# Patient Record
Sex: MALE | Race: WHITE | NOT HISPANIC OR LATINO | Employment: UNEMPLOYED | URBAN - METROPOLITAN AREA
[De-identification: names, ages, dates, MRNs, and addresses within clinical notes are randomized per-mention and may not be internally consistent; named-entity substitution may affect disease eponyms.]

---

## 2024-01-11 ENCOUNTER — OFFICE VISIT (OUTPATIENT)
Dept: PRIMARY CARE | Facility: CLINIC | Age: 59
End: 2024-01-11
Payer: MEDICAID

## 2024-01-11 VITALS
OXYGEN SATURATION: 97 % | BODY MASS INDEX: 24.92 KG/M2 | WEIGHT: 188 LBS | HEART RATE: 71 BPM | DIASTOLIC BLOOD PRESSURE: 110 MMHG | HEIGHT: 73 IN | SYSTOLIC BLOOD PRESSURE: 154 MMHG

## 2024-01-11 DIAGNOSIS — Z00.00 HEALTH CARE MAINTENANCE: Primary | ICD-10-CM

## 2024-01-11 PROCEDURE — 99396 PREV VISIT EST AGE 40-64: CPT | Performed by: INTERNAL MEDICINE

## 2024-01-11 PROCEDURE — 1036F TOBACCO NON-USER: CPT | Performed by: INTERNAL MEDICINE

## 2024-01-11 RX ORDER — OMEPRAZOLE 40 MG/1
40 CAPSULE, DELAYED RELEASE ORAL AS NEEDED
COMMUNITY
End: 2024-03-07 | Stop reason: ALTCHOICE

## 2024-01-11 RX ORDER — METOPROLOL SUCCINATE 25 MG/1
25 TABLET, EXTENDED RELEASE ORAL DAILY
COMMUNITY
Start: 2024-01-02

## 2024-01-11 RX ORDER — LANOLIN ALCOHOL/MO/W.PET/CERES
1 CREAM (GRAM) TOPICAL DAILY
COMMUNITY

## 2024-01-11 ASSESSMENT — PATIENT HEALTH QUESTIONNAIRE - PHQ9
SUM OF ALL RESPONSES TO PHQ9 QUESTIONS 1 AND 2: 0
1. LITTLE INTEREST OR PLEASURE IN DOING THINGS: NOT AT ALL
2. FEELING DOWN, DEPRESSED OR HOPELESS: NOT AT ALL

## 2024-01-11 NOTE — PROGRESS NOTES
Reason for Visit: Annual Physical Exam  Allergies and Medications  Lisinopril, Amlodipine, and Metronidazole  Current Outpatient Medications   Medication Instructions    cyanocobalamin (Vitamin B-12) 1,000 mcg tablet 1 tablet, oral, Daily    metoprolol succinate XL (TOPROL-XL) 25 mg, oral, Daily    omeprazole (PRILOSEC) 40 mg, oral, Daily     HPI:  58-year-old male patient presented to the office today for his annual examination.  Patient is known to have history of recurrent diverticulitis treated on multiple occasions with long courses of antibiotics.  Last time I saw the patient in my office he had GI referral and he was supposed to schedule colonoscopy but his insurance changed and he was not able to come back to [facility] now.  He has been closely followed by gastroenterologist in [facility] and he is scheduled to proceed with upper endoscopy and colonoscopy with them in the near future.  He had presented to the [facility] emergency room twice for evaluation of chest pain.  His cardiac evaluation was negative and it was determined that his chest pain is related to his acid reflux.  CT scan was completed and it did demonstrate the presence of esophagitis in the distal esophagus and thickening of the esophagus.  Also there was a questionable mass at the gastroesophageal junction.  He is scheduled to proceed with upper endoscopy and ultrasound for further evaluation of the mass and the lining of his esophagus.  He has been taking Prilosec not on a daily basis and not on a regular matter.  He does have symptoms of heartburn and acid reflux and occasional episodes of chest pain that are significantly concerning and bothersome for him but he does respond nicely to Pepcid and a GI cocktail when he receives it in the emergency room.  Otherwise patient is doing great he has no other complaints he denies any urinary symptoms.    ROS otherwise negative aside from what was mentioned above in  "HPI.    Vitals  BP (!) 154/110 (BP Location: Right arm, Patient Position: Sitting)   Pulse 71   Ht 1.842 m (6' 0.5\")   Wt 85.3 kg (188 lb)   SpO2 97%   BMI 25.15 kg/m²   Body mass index is 25.15 kg/m².  Physical Exam  Physical Exam  Constitutional:       Appearance: Normal appearance.   HENT:      Head: Normocephalic and atraumatic.   Eyes:      Extraocular Movements: Extraocular movements intact.      Pupils: Pupils are equal, round, and reactive to light.   Cardiovascular:      Rate and Rhythm: Normal rate and regular rhythm.      Pulses: Normal pulses.      Heart sounds: Normal heart sounds.   Pulmonary:      Effort: Pulmonary effort is normal.      Breath sounds: Normal breath sounds.   Neurological:      General: No focal deficit present.      Mental Status: He is alert. Mental status is at baseline.   Psychiatric:         Mood and Affect: Mood normal.         Thought Content: Thought content normal.         Judgment: Judgment normal.        Active Problem List    Comprehensive Medical/Surgical/Social/Family History  Past Medical History:   Diagnosis Date    Abdominal distension (gaseous) 07/13/2018    Abdominal bloating    Abdominal distension (gaseous) 07/13/2018    Abdominal bloating    Abdominal distension (gaseous) 07/18/2018    Abdominal bloating    Abdominal distension (gaseous) 07/18/2018    Abdominal bloating    Abnormal levels of other serum enzymes     Elevated liver enzymes    Allergic rhinitis, unspecified 05/29/2020    Allergic rhinitis with postnasal drip    COVID-19 07/20/2022    COVID-19    Decreased white blood cell count, unspecified 05/29/2020    Leukopenia    Demyelinating disease of central nervous system, unspecified (CMS/[facility]) 10/14/2019    Demyelinating disorder    Diarrhea, unspecified 07/13/2018    Acute diarrhea    Diarrhea, unspecified 07/13/2018    Acute diarrhea    Diarrhea, unspecified 07/18/2018    Acute diarrhea    Diarrhea, unspecified 07/18/2018    Acute diarrhea    " Disease of stomach and duodenum, unspecified 07/13/2018    Subepithelial gastric lesion    Disease of stomach and duodenum, unspecified 07/13/2018    Subepithelial gastric lesion    Disease of stomach and duodenum, unspecified 07/18/2018    Subepithelial gastric lesion    Disease of stomach and duodenum, unspecified 07/18/2018    Subepithelial gastric lesion    Disorder of the skin and subcutaneous tissue, unspecified 09/05/2019    Skin abnormality    Essential (primary) hypertension 07/20/2022    HTN (hypertension), benign    Helicobacter pylori (H. pylori) as the cause of diseases classified elsewhere 10/11/2019    Helicobacter pylori [H. pylori] as the cause of diseases classified elsewhere    Impacted cerumen, left ear 12/19/2019    Impacted cerumen of left ear    Incomplete defecation 09/21/2020    Incomplete defecation    Iron deficiency     Low iron    Left upper quadrant pain 09/21/2020    Left upper quadrant abdominal pain    Long term (current) use of non-steroidal anti-inflammatories (nsaid) 06/29/2021    NSAID long-term use    Malabsorption due to intolerance, not elsewhere classified 07/13/2018    PLE (protein-losing enteropathy)    Malabsorption due to intolerance, not elsewhere classified 07/13/2018    PLE (protein-losing enteropathy)    Malabsorption due to intolerance, not elsewhere classified 07/18/2018    PLE (protein-losing enteropathy)    Malabsorption due to intolerance, not elsewhere classified 07/18/2018    PLE (protein-losing enteropathy)    Myopia, bilateral 05/24/2021    Bilateral myopia    Nontoxic multinodular goiter 03/26/2021    Multinodular goiter    Other chest pain 10/02/2020    Chest pain, non-cardiac    Other constipation 07/13/2018    Chronic constipation    Other constipation 07/13/2018    Chronic constipation    Other constipation 07/18/2018    Chronic constipation    Other constipation 07/18/2018    Chronic constipation    Other diseases of stomach and duodenum 07/13/2018     Gastric nodule    Other diseases of stomach and duodenum 07/13/2018    Gastric nodule    Other diseases of stomach and duodenum 07/18/2018    Gastric nodule    Other diseases of stomach and duodenum 07/18/2018    Gastric nodule    Other forms of dyspnea 01/10/2021    Exertional dyspnea    Other microscopic hematuria 07/13/2018    Microscopic hematuria    Other microscopic hematuria 07/13/2018    Microscopic hematuria    Other microscopic hematuria 07/18/2018    Microscopic hematuria    Other microscopic hematuria 07/18/2018    Microscopic hematuria    Otitis media, unspecified, right ear 09/12/2019    Right otitis media    Paresthesia of skin     Prickling sensation    Personal history of other diseases of the circulatory system 05/17/2018    History of paroxysmal supraventricular tachycardia    Personal history of other diseases of the nervous system and sense organs     History of paralysis    Personal history of other diseases of the respiratory system     History of sinusitis    Personal history of other diseases of the respiratory system 10/22/2019    History of nasal obstruction    Personal history of other diseases of the respiratory system 04/08/2020    History of acute sinusitis    Personal history of other specified conditions     H/O shortness of breath    Personal history of other specified conditions 09/20/2019    History of dyspnea    Personal history of other specified conditions 05/29/2020    History of palpitations    Personal history of other specified conditions 10/02/2020    History of dizziness    Personal history of other specified conditions 10/02/2020    History of palpitations    Personal history of other venous thrombosis and embolism     H/O blood clots    Post-traumatic headache, unspecified, not intractable 09/09/2020    Post-concussion headache    Unspecified asthma, uncomplicated 09/21/2021    Asthma    Unspecified atherosclerosis of native arteries of extremities, other extremity  (CMS/[facility]) 2020    Radial artery occlusion, right    Unspecified blepharitis right upper eyelid 2021    Blepharitis of right upper eyelid    Upper abdominal pain, unspecified 10/10/2018    Upper abdominal pain of unknown etiology    Upper abdominal pain, unspecified 2018    Upper abdominal pain of unknown etiology    Upper abdominal pain, unspecified 2018    Upper abdominal pain of unknown etiology    Upper abdominal pain, unspecified 2018    Upper abdominal pain of unknown etiology    Upper abdominal pain, unspecified 2018    Upper abdominal pain of unknown etiology     Past Surgical History:   Procedure Laterality Date    ADENOIDECTOMY  2018    Adenoidectomy    COLONOSCOPY  2018    Colonoscopy    CT ANGIO NECK  2021    CT NECK ANGIO W AND WO IV CONTRAST 2021 [facility]    CT HEAD ANGIO W AND WO IV CONTRAST  2021    CT HEAD ANGIO W AND WO IV CONTRAST 2021 [facility]    ESOPHAGOGASTRODUODENOSCOPY  2018    Diagnostic Esophagogastroduodenoscopy    OTHER SURGICAL HISTORY  2018    Endoscopic Ultrasound Pancreatic    OTHER SURGICAL HISTORY  2018    Append Laparosc Addl ___ Mm Port Placed In R Lower Abdomen    OTHER SURGICAL HISTORY  2022    Ganglion cyst excision    OTHER SURGICAL HISTORY  2019    Appendectomy    OTHER SURGICAL HISTORY  10/14/2019    Liver biopsy     Social History     Social History Narrative    Not on file   Patient does not smoke does not drink alcohol he drinks 1 cup of coffee a day he works as a [employer] .  Single no children.  No family history on file.   Family history  His great grandmother and mother side has history of colon cancer and stomach cancer.  His dad  in an accident in .  He has 1 full brother who is older and in good health and one half brother.  Assessment and Plan:  Problem List Items Addressed This Visit    None  Visit Diagnoses       Health care  maintenance    -  Primary    Relevant Orders    CBC    Comprehensive Metabolic Panel    Lipid Panel    PSA, Total and Free    TSH with reflex to Free T4 if abnormal        58-year-old patient with the following issues.    1.  Acid reflux with evidence of thickened esophagus distally noted and occasional episodes of chest pain likely related to acid reflux, patient was encouraged to take his proton pump inhibitor on a daily basis 30 minutes before breakfast without any interruption.  And he is to proceed with his GI evaluation in [facility] coming up for the upper endoscopy as well as an ultrasound for further evaluation of the questionable mass noted at the gastroesophageal junction.  Patient stated understanding he also was encouraged to avoid the triggers and follow diet and lifestyle modification.    2.  Recurrent episodes of diverticulitis he has been doing well from that perspective colonoscopy is scheduled in the near future.    3.  Hypertension, he thinks his blood pressure today is elevated but at home it is not he is going to check his blood pressure at home and record his readings and come back with his blood pressure machine we will adjust his beta-blocker dose and increase it to 50 mg p.o. daily I was hesitant to do that today because he told me he occasionally feels some lightheadedness when he changes position.  I encouraged him to hydrate to keep his intravascular volume repleted.    4.  Health maintenance issues, lab work is requested prostate cancer screening will be completed vaccines are up-to-date.    Disposition I will see the patient back in the office in 1 year for repeat physical and sooner if needed.  We would definitely monitor his blood pressure closely.

## 2024-01-12 ENCOUNTER — LAB (OUTPATIENT)
Dept: LAB | Facility: LAB | Age: 59
End: 2024-01-12
Payer: MEDICAID

## 2024-01-12 DIAGNOSIS — Z00.00 HEALTH CARE MAINTENANCE: ICD-10-CM

## 2024-01-12 LAB
ALBUMIN SERPL BCP-MCNC: 4.5 G/DL (ref 3.4–5)
ALP SERPL-CCNC: 53 U/L (ref 33–120)
ALT SERPL W P-5'-P-CCNC: 15 U/L (ref 10–52)
ANION GAP SERPL CALC-SCNC: 12 MMOL/L (ref 10–20)
AST SERPL W P-5'-P-CCNC: 17 U/L (ref 9–39)
BILIRUB SERPL-MCNC: 0.8 MG/DL (ref 0–1.2)
BUN SERPL-MCNC: 15 MG/DL (ref 6–23)
CALCIUM SERPL-MCNC: 9.4 MG/DL (ref 8.6–10.3)
CHLORIDE SERPL-SCNC: 104 MMOL/L (ref 98–107)
CHOLEST SERPL-MCNC: 225 MG/DL (ref 0–199)
CHOLESTEROL/HDL RATIO: 4
CO2 SERPL-SCNC: 28 MMOL/L (ref 21–32)
CREAT SERPL-MCNC: 1 MG/DL (ref 0.5–1.3)
EGFRCR SERPLBLD CKD-EPI 2021: 87 ML/MIN/1.73M*2
ERYTHROCYTE [DISTWIDTH] IN BLOOD BY AUTOMATED COUNT: 12.2 % (ref 11.5–14.5)
GLUCOSE SERPL-MCNC: 86 MG/DL (ref 74–99)
HCT VFR BLD AUTO: 43.1 % (ref 41–52)
HDLC SERPL-MCNC: 56.9 MG/DL
HGB BLD-MCNC: 14.3 G/DL (ref 13.5–17.5)
LDLC SERPL CALC-MCNC: 151 MG/DL
MCH RBC QN AUTO: 30.4 PG (ref 26–34)
MCHC RBC AUTO-ENTMCNC: 33.2 G/DL (ref 32–36)
MCV RBC AUTO: 92 FL (ref 80–100)
NON HDL CHOLESTEROL: 168 MG/DL (ref 0–149)
NRBC BLD-RTO: 0 /100 WBCS (ref 0–0)
PLATELET # BLD AUTO: 177 X10*3/UL (ref 150–450)
POTASSIUM SERPL-SCNC: 4.1 MMOL/L (ref 3.5–5.3)
PROT SERPL-MCNC: 6.7 G/DL (ref 6.4–8.2)
RBC # BLD AUTO: 4.71 X10*6/UL (ref 4.5–5.9)
SODIUM SERPL-SCNC: 140 MMOL/L (ref 136–145)
TRIGL SERPL-MCNC: 84 MG/DL (ref 0–149)
TSH SERPL-ACNC: 1.88 MIU/L (ref 0.44–3.98)
VLDL: 17 MG/DL (ref 0–40)
WBC # BLD AUTO: 4 X10*3/UL (ref 4.4–11.3)

## 2024-01-12 PROCEDURE — 80053 COMPREHEN METABOLIC PANEL: CPT

## 2024-01-12 PROCEDURE — 84153 ASSAY OF PSA TOTAL: CPT

## 2024-01-12 PROCEDURE — 80061 LIPID PANEL: CPT

## 2024-01-12 PROCEDURE — 84154 ASSAY OF PSA FREE: CPT

## 2024-01-12 PROCEDURE — 36415 COLL VENOUS BLD VENIPUNCTURE: CPT

## 2024-01-12 PROCEDURE — 85027 COMPLETE CBC AUTOMATED: CPT

## 2024-01-12 PROCEDURE — 84443 ASSAY THYROID STIM HORMONE: CPT

## 2024-01-15 LAB
PSA FREE MFR SERPL: 50 %
PSA FREE SERPL-MCNC: 0.3 NG/ML
PSA SERPL IA-MCNC: 0.6 NG/ML (ref 0–4)

## 2024-01-18 DIAGNOSIS — R07.9 CHEST PAIN, UNSPECIFIED TYPE: Primary | ICD-10-CM

## 2024-01-18 DIAGNOSIS — R00.0 TACHYCARDIA: ICD-10-CM

## 2024-01-18 DIAGNOSIS — R00.2 PALPITATIONS: ICD-10-CM

## 2024-01-19 ENCOUNTER — APPOINTMENT (OUTPATIENT)
Dept: PRIMARY CARE | Facility: CLINIC | Age: 59
End: 2024-01-19
Payer: MEDICAID

## 2024-01-19 PROBLEM — G43.909 MIGRAINE: Status: ACTIVE | Noted: 2024-01-19

## 2024-01-19 PROBLEM — F51.01 PRIMARY INSOMNIA: Status: ACTIVE | Noted: 2024-01-19

## 2024-01-19 PROBLEM — E55.9 VITAMIN D DEFICIENCY: Status: ACTIVE | Noted: 2024-01-19

## 2024-01-19 PROBLEM — K21.9 GERD (GASTROESOPHAGEAL REFLUX DISEASE): Status: ACTIVE | Noted: 2022-12-01

## 2024-01-19 PROBLEM — M54.16 LUMBAR RADICULOPATHY: Status: ACTIVE | Noted: 2024-01-19

## 2024-01-19 PROBLEM — I99.8 VASCULAR INSUFFICIENCY: Status: ACTIVE | Noted: 2022-01-26

## 2024-01-19 PROBLEM — K40.91 RECURRENT INGUINAL HERNIA: Status: ACTIVE | Noted: 2022-01-26

## 2024-01-19 PROBLEM — K57.92 ACUTE DIVERTICULITIS: Status: ACTIVE | Noted: 2022-11-13

## 2024-01-19 PROBLEM — A08.4 GASTROENTERITIS AND COLITIS, VIRAL: Status: ACTIVE | Noted: 2023-07-17

## 2024-01-19 PROBLEM — H52.13 MYOPIA, BILATERAL: Status: ACTIVE | Noted: 2019-04-16

## 2024-01-19 PROBLEM — F41.9 ANXIETY: Status: ACTIVE | Noted: 2024-01-19

## 2024-01-19 PROBLEM — R31.29 MICROSCOPIC HEMATURIA: Status: ACTIVE | Noted: 2018-06-12

## 2024-01-19 PROBLEM — R42 VERTIGO: Status: ACTIVE | Noted: 2024-01-19

## 2024-01-19 PROBLEM — R06.02 SHORTNESS OF BREATH: Status: ACTIVE | Noted: 2024-01-19

## 2024-01-19 PROBLEM — J34.2 DEVIATED NASAL SEPTUM: Status: ACTIVE | Noted: 2022-01-26

## 2024-01-23 ENCOUNTER — HOSPITAL ENCOUNTER (OUTPATIENT)
Dept: RADIOLOGY | Facility: CLINIC | Age: 59
Discharge: HOME | End: 2024-01-23
Payer: MEDICAID

## 2024-01-23 ENCOUNTER — APPOINTMENT (OUTPATIENT)
Dept: PRIMARY CARE | Facility: CLINIC | Age: 59
End: 2024-01-23
Payer: MEDICAID

## 2024-01-23 DIAGNOSIS — R07.9 CHEST PAIN, UNSPECIFIED TYPE: ICD-10-CM

## 2024-01-23 PROCEDURE — 71046 X-RAY EXAM CHEST 2 VIEWS: CPT

## 2024-01-23 PROCEDURE — 71046 X-RAY EXAM CHEST 2 VIEWS: CPT | Performed by: RADIOLOGY

## 2024-01-26 ENCOUNTER — OFFICE VISIT (OUTPATIENT)
Dept: CARDIOLOGY | Facility: CLINIC | Age: 59
End: 2024-01-26
Payer: MEDICAID

## 2024-01-26 VITALS
BODY MASS INDEX: 25.45 KG/M2 | RESPIRATION RATE: 18 BRPM | DIASTOLIC BLOOD PRESSURE: 66 MMHG | HEART RATE: 72 BPM | WEIGHT: 192 LBS | HEIGHT: 73 IN | SYSTOLIC BLOOD PRESSURE: 126 MMHG | OXYGEN SATURATION: 98 %

## 2024-01-26 DIAGNOSIS — I49.3 PVC (PREMATURE VENTRICULAR CONTRACTION): ICD-10-CM

## 2024-01-26 DIAGNOSIS — I47.19 ATRIAL TACHYCARDIA (CMS-HCC): ICD-10-CM

## 2024-01-26 DIAGNOSIS — R07.9 CHEST PAIN, UNSPECIFIED TYPE: Primary | ICD-10-CM

## 2024-01-26 DIAGNOSIS — I49.1 PAC (PREMATURE ATRIAL CONTRACTION): ICD-10-CM

## 2024-01-26 PROCEDURE — 1036F TOBACCO NON-USER: CPT | Performed by: NURSE PRACTITIONER

## 2024-01-26 PROCEDURE — 99215 OFFICE O/P EST HI 40 MIN: CPT | Performed by: NURSE PRACTITIONER

## 2024-01-26 PROCEDURE — 3074F SYST BP LT 130 MM HG: CPT | Performed by: NURSE PRACTITIONER

## 2024-01-26 PROCEDURE — 93000 ELECTROCARDIOGRAM COMPLETE: CPT | Performed by: NURSE PRACTITIONER

## 2024-01-26 PROCEDURE — 3078F DIAST BP <80 MM HG: CPT | Performed by: NURSE PRACTITIONER

## 2024-01-26 NOTE — PROGRESS NOTES
"Name : Robin Nguyễn   : 1965   MRN : [id number]   ENC Date : 2024    CC: Annual Exam, Hypertension, and Chest Pain     HPI:    Mr Nguyễn is a 58 y.o. male with PMHx sig for mild bronchial asthma, symptomatic PACs/PVCs & chronic chest pain who presents today to establish care.    Per Dr Crook's last note in :  He was previously seen by Dr. Ochoa and Dr. Muir as well as other cardiologist at the [facility] for symptomatic PACs/PVCs. On treatment with Flecainide & Metoprolol by Dr. Velazquez. Historically, he has had intermittent exertional dyspnea over the years and has had intermittent vague cardiac complaints, including angina in 2021 that prompted testing and left heart catheterization via the right radial approach that was associated with right radial artery occlusion.     OV Today:  Reporting chest pain today. Chest pain started 2023, never went away, occurs daily but off & on throughout the day, no palliative nor punitive factors, not worse with activity, no N/V, feels like his entire rib cage hurts.    Exercise: Walk nonstop all day. [employer].  Treadmill 3x a week, 15mins at a time, \"good pace\". Doesn't make the pain any worse.    Has been seeing GI at [facility] for this pain too as there was a ddx of esophagitis/GI involement. Just had upper endoscopy 24 with Dr. Joe Lew. Biopsies taken. Report states there is benign appearing esophageal stenosis, benign gastric tumor, acute gastritis. In the impression, Dr. Lew writes \"I wonder if the gastric mass causing the dysphagia and chest discomfort\". It looks like based on his gastric FNA results, Robin was referred to see General Surgeon Dr Moreno at [facility].    Also known to have degenerative discs in spine, \"bad C6\". Has seen Dr. Acevedo in the past for post concussion headaches & is going to re-establish with her.    All of the most recent GI & cardiology notes & testing from [facility] reviewed.    CV " Diagnostics:  Normal exercise stress test 11/2021 with outstanding functional capacity and no chest pain or EKG changes.     Echo 8/10/23 @ [facility]: EF 56%, mild AR, mild mitral annular calcification, trace TR, aorta is borderline dilated with maximal dimension of 4.0cm    Exercise Stress Test 8/25/23 @ [facility]: Normal, 8.8 METS using FRIEND equation, 10.9 METS based on ACSM equation    48hr Event Monitor 07/2023@ [facility]: Predominant rhythm Sinus Rhythm, min HR 42 bpm, max  bpm, avg HR 72 bpm, Possible Atrial Tachycardia with variable block was present. PAC/PVCs <1%. No other sustained arrhythmias documented.    ROS: unless otherwise noted in the history of present illness, all other systems were reviewed and they are negative for complaints     Allergies:  Lisinopril, Amlodipine, Prednisone, and Metronidazole    Current Outpatient Medications   Medication Instructions    cyanocobalamin (Vitamin B-12) 1,000 mcg tablet 1 tablet, oral, Daily    metoprolol succinate XL (TOPROL-XL) 25 mg, oral, Daily    omeprazole (PRILOSEC) 40 mg, oral, As needed        Last Labs:  CBC  Lab Results   Component Value Date    WBC 4.0 (L) 01/12/2024    HGB 14.3 01/12/2024    HCT 43.1 01/12/2024    MCV 92 01/12/2024     01/12/2024       CMP  Lab Results   Component Value Date    CALCIUM 9.4 01/12/2024    PROT 6.7 01/12/2024    ALBUMIN 4.5 01/12/2024    AST 17 01/12/2024    ALT 15 01/12/2024    ALKPHOS 53 01/12/2024    BILITOT 0.8 01/12/2024       BMP   Lab Results   Component Value Date     01/12/2024    K 4.1 01/12/2024     01/12/2024    CO2 28 01/12/2024    GLUCOSE 86 01/12/2024    BUN 15 01/12/2024    CREATININE 1.00 01/12/2024       LIPID PANEL   Lab Results   Component Value Date    CHOL 225 (H) 01/12/2024    TRIG 84 01/12/2024    HDL 56.9 01/12/2024    CHHDL 4.0 01/12/2024    LDLF 137 (H) 07/21/2022    VLDL 17 01/12/2024    NHDL 168 (H) 01/12/2024       RENAL FUNCTION PANEL   Lab Results   Component Value Date     "GLUCOSE 86 01/12/2024     01/12/2024    K 4.1 01/12/2024     01/12/2024    CO2 28 01/12/2024    ANIONGAP 12 01/12/2024    BUN 15 01/12/2024    CREATININE 1.00 01/12/2024    GFRMALE >90 12/01/2022    CALCIUM 9.4 01/12/2024    ALBUMIN 4.5 01/12/2024        Lab Results   Component Value Date    BNP 35 10/05/2019    HGBA1C 5.3 10/14/2019       Last Recorded Vitals:  Vitals:    01/26/24 0939   BP: 126/66   BP Location: Left arm   Patient Position: Sitting   Pulse: 72   Resp: 18   SpO2: 98%   Weight: 87.1 kg (192 lb)   Height: 1.842 m (6' 0.5\")     Physical Exam:  On exam Mr. Nguyễn appears his stated age, is alert and oriented x3, and in no acute distress. His sclera are anicteric and his oropharynx has moist mucous membranes. His neck is supple and without thyromegaly. The JVP is ~5 cm of water above the right atrium. His cardiac exam has regular rhythm, normal S1, S2. No S3/4. There are no murmurs. His lungs are clear to auscultation bilaterally and there is no dullness to percussion. His abdomen is soft, nontender with normoactive bowel sounds. There is no HJR. The extremities are warm and without edema. The skin is dry. There is no rash present. The distal pulses are 2-3+ in all four extremities. His mood and affect are appropriate for todays encounter.     Assessment/Plan:  Symptomatic PACs/PVCs  Atrial Tachycardia  Atypical Chest Pain    EKG today shows Normal Sinus Rhythm, HR 68 bpm  His symptomatic PACs/PVCs previously managed by Dr. Velazquez with flecainide and metoprolol. Currently on Metoprolol XL 25mg every day only. Given his persistent symptoms, I would further risk stratify with a Lexiscan for diffusion imaging. If this is normal, then I would no pursue any further work up.    He needs to follow up with his GI    Also advised he bring up this chest pain to Dr. Acevedo when he sees her as this could be referred from his cervical injury.     If Lexiscan is normal, also recommend referral to pain " management.    Follow up after testing.     Tracy M Schwab, APRN-CNP

## 2024-01-26 NOTE — PATIENT INSTRUCTIONS
- Nuclear Stress  - Follow up with me after    It was my pleasure to meet you. I look forward to being your cardiac Nurse Practitioner. I am a huge believer in communicating with my patients. Please contact me at any time, if anything is not clear to you regarding anything we have discussed, or if new questions occur to you.

## 2024-01-29 ENCOUNTER — PATIENT MESSAGE (OUTPATIENT)
Dept: PRIMARY CARE | Facility: CLINIC | Age: 59
End: 2024-01-29
Payer: MEDICAID

## 2024-01-30 ENCOUNTER — APPOINTMENT (OUTPATIENT)
Dept: PRIMARY CARE | Facility: CLINIC | Age: 59
End: 2024-01-30
Payer: MEDICAID

## 2024-02-02 ENCOUNTER — APPOINTMENT (OUTPATIENT)
Dept: RADIOLOGY | Facility: HOSPITAL | Age: 59
End: 2024-02-02
Payer: MEDICAID

## 2024-02-02 ENCOUNTER — HOSPITAL ENCOUNTER (EMERGENCY)
Facility: HOSPITAL | Age: 59
Discharge: HOME | End: 2024-02-02
Attending: STUDENT IN AN ORGANIZED HEALTH CARE EDUCATION/TRAINING PROGRAM
Payer: MEDICAID

## 2024-02-02 VITALS
OXYGEN SATURATION: 100 % | TEMPERATURE: 99.3 F | WEIGHT: 188 LBS | BODY MASS INDEX: 25.47 KG/M2 | RESPIRATION RATE: 18 BRPM | HEIGHT: 72 IN | HEART RATE: 78 BPM | DIASTOLIC BLOOD PRESSURE: 90 MMHG | SYSTOLIC BLOOD PRESSURE: 158 MMHG

## 2024-02-02 DIAGNOSIS — R10.84 GENERALIZED ABDOMINAL PAIN: Primary | ICD-10-CM

## 2024-02-02 LAB
ALBUMIN SERPL BCP-MCNC: 4.8 G/DL (ref 3.4–5)
ALP SERPL-CCNC: 50 U/L (ref 33–120)
ALT SERPL W P-5'-P-CCNC: 16 U/L (ref 10–52)
ANION GAP SERPL CALC-SCNC: 11 MMOL/L (ref 10–20)
APPEARANCE UR: CLEAR
AST SERPL W P-5'-P-CCNC: 19 U/L (ref 9–39)
BASOPHILS # BLD AUTO: 0.04 X10*3/UL (ref 0–0.1)
BASOPHILS NFR BLD AUTO: 0.9 %
BILIRUB SERPL-MCNC: 0.7 MG/DL (ref 0–1.2)
BILIRUB UR STRIP.AUTO-MCNC: NEGATIVE MG/DL
BUN SERPL-MCNC: 17 MG/DL (ref 6–23)
CALCIUM SERPL-MCNC: 9.5 MG/DL (ref 8.6–10.3)
CHLORIDE SERPL-SCNC: 102 MMOL/L (ref 98–107)
CO2 SERPL-SCNC: 27 MMOL/L (ref 21–32)
COLOR UR: NORMAL
CREAT SERPL-MCNC: 0.86 MG/DL (ref 0.5–1.3)
EGFRCR SERPLBLD CKD-EPI 2021: >90 ML/MIN/1.73M*2
EOSINOPHIL # BLD AUTO: 0.1 X10*3/UL (ref 0–0.7)
EOSINOPHIL NFR BLD AUTO: 2.4 %
ERYTHROCYTE [DISTWIDTH] IN BLOOD BY AUTOMATED COUNT: 12.1 % (ref 11.5–14.5)
GLUCOSE SERPL-MCNC: 99 MG/DL (ref 74–99)
GLUCOSE UR STRIP.AUTO-MCNC: NEGATIVE MG/DL
HCT VFR BLD AUTO: 43.6 % (ref 41–52)
HGB BLD-MCNC: 14.5 G/DL (ref 13.5–17.5)
HOLD SPECIMEN: NORMAL
IMM GRANULOCYTES # BLD AUTO: 0.02 X10*3/UL (ref 0–0.7)
IMM GRANULOCYTES NFR BLD AUTO: 0.5 % (ref 0–0.9)
KETONES UR STRIP.AUTO-MCNC: NEGATIVE MG/DL
LEUKOCYTE ESTERASE UR QL STRIP.AUTO: NEGATIVE
LYMPHOCYTES # BLD AUTO: 1.21 X10*3/UL (ref 1.2–4.8)
LYMPHOCYTES NFR BLD AUTO: 28.5 %
MCH RBC QN AUTO: 30 PG (ref 26–34)
MCHC RBC AUTO-ENTMCNC: 33.3 G/DL (ref 32–36)
MCV RBC AUTO: 90 FL (ref 80–100)
MONOCYTES # BLD AUTO: 0.34 X10*3/UL (ref 0.1–1)
MONOCYTES NFR BLD AUTO: 8 %
NEUTROPHILS # BLD AUTO: 2.54 X10*3/UL (ref 1.2–7.7)
NEUTROPHILS NFR BLD AUTO: 59.7 %
NITRITE UR QL STRIP.AUTO: NEGATIVE
NRBC BLD-RTO: 0 /100 WBCS (ref 0–0)
PH UR STRIP.AUTO: 6 [PH]
PLATELET # BLD AUTO: 197 X10*3/UL (ref 150–450)
POTASSIUM SERPL-SCNC: 4.2 MMOL/L (ref 3.5–5.3)
PROT SERPL-MCNC: 7.2 G/DL (ref 6.4–8.2)
PROT UR STRIP.AUTO-MCNC: NEGATIVE MG/DL
RBC # BLD AUTO: 4.84 X10*6/UL (ref 4.5–5.9)
RBC # UR STRIP.AUTO: NEGATIVE /UL
SODIUM SERPL-SCNC: 136 MMOL/L (ref 136–145)
SP GR UR STRIP.AUTO: 1
UROBILINOGEN UR STRIP.AUTO-MCNC: <2 MG/DL
WBC # BLD AUTO: 4.3 X10*3/UL (ref 4.4–11.3)

## 2024-02-02 PROCEDURE — 2550000001 HC RX 255 CONTRASTS: Performed by: STUDENT IN AN ORGANIZED HEALTH CARE EDUCATION/TRAINING PROGRAM

## 2024-02-02 PROCEDURE — 74177 CT ABD & PELVIS W/CONTRAST: CPT | Mod: FOREIGN READ | Performed by: RADIOLOGY

## 2024-02-02 PROCEDURE — 99284 EMERGENCY DEPT VISIT MOD MDM: CPT | Performed by: STUDENT IN AN ORGANIZED HEALTH CARE EDUCATION/TRAINING PROGRAM

## 2024-02-02 PROCEDURE — 81003 URINALYSIS AUTO W/O SCOPE: CPT | Performed by: STUDENT IN AN ORGANIZED HEALTH CARE EDUCATION/TRAINING PROGRAM

## 2024-02-02 PROCEDURE — 36415 COLL VENOUS BLD VENIPUNCTURE: CPT | Performed by: STUDENT IN AN ORGANIZED HEALTH CARE EDUCATION/TRAINING PROGRAM

## 2024-02-02 PROCEDURE — 85025 COMPLETE CBC W/AUTO DIFF WBC: CPT | Performed by: STUDENT IN AN ORGANIZED HEALTH CARE EDUCATION/TRAINING PROGRAM

## 2024-02-02 PROCEDURE — 84075 ASSAY ALKALINE PHOSPHATASE: CPT | Performed by: STUDENT IN AN ORGANIZED HEALTH CARE EDUCATION/TRAINING PROGRAM

## 2024-02-02 PROCEDURE — 74177 CT ABD & PELVIS W/CONTRAST: CPT

## 2024-02-02 PROCEDURE — 99285 EMERGENCY DEPT VISIT HI MDM: CPT | Performed by: STUDENT IN AN ORGANIZED HEALTH CARE EDUCATION/TRAINING PROGRAM

## 2024-02-02 RX ADMIN — IOHEXOL 75 ML: 350 INJECTION, SOLUTION INTRAVENOUS at 13:41

## 2024-02-02 ASSESSMENT — LIFESTYLE VARIABLES
EVER FELT BAD OR GUILTY ABOUT YOUR DRINKING: NO
HAVE PEOPLE ANNOYED YOU BY CRITICIZING YOUR DRINKING: NO
HAVE YOU EVER FELT YOU SHOULD CUT DOWN ON YOUR DRINKING: NO
EVER HAD A DRINK FIRST THING IN THE MORNING TO STEADY YOUR NERVES TO GET RID OF A HANGOVER: NO

## 2024-02-02 ASSESSMENT — COLUMBIA-SUICIDE SEVERITY RATING SCALE - C-SSRS
1. IN THE PAST MONTH, HAVE YOU WISHED YOU WERE DEAD OR WISHED YOU COULD GO TO SLEEP AND NOT WAKE UP?: NO
2. HAVE YOU ACTUALLY HAD ANY THOUGHTS OF KILLING YOURSELF?: NO
6. HAVE YOU EVER DONE ANYTHING, STARTED TO DO ANYTHING, OR PREPARED TO DO ANYTHING TO END YOUR LIFE?: NO

## 2024-02-02 ASSESSMENT — PAIN DESCRIPTION - PAIN TYPE: TYPE: ACUTE PAIN

## 2024-02-02 ASSESSMENT — PAIN SCALES - GENERAL: PAINLEVEL_OUTOF10: 2

## 2024-02-02 ASSESSMENT — PAIN - FUNCTIONAL ASSESSMENT: PAIN_FUNCTIONAL_ASSESSMENT: 0-10

## 2024-02-02 ASSESSMENT — PAIN DESCRIPTION - LOCATION: LOCATION: ABDOMEN

## 2024-02-02 NOTE — ED PROVIDER NOTES
EMERGENCY DEPARTMENT ENCOUNTER      Pt Name: Robin Nguyễn  MRN: [id number]  Birthdate 1965  Date of evaluation: 2/2/2024  Provider: Sherwin Butterfield DO    CHIEF COMPLAINT       Chief Complaint   Patient presents with    Abdominal Pain     Reports hx of diverticulitis. Lower/ mid abd pain. X3 days.            HISTORY OF PRESENT ILLNESS    58-year-old male presenting with lower abdominal pain and nausea for the past 3 days.  He has a history of diverticulitis with perforation in 2022 and thinks that he may be having a mild flare.  He says that he feels much much better than he did 2 years ago when he had diverticulitis and has not had a flareup since then.  He had a colonoscopy approximately 2 weeks ago at [facility] and says that noted some enteritis but otherwise it was unremarkable.  He also says that over the past couple of days he notices that he has been having some urinary frequency but does not have any pain with urination discharge or blood in the urine.  He denies diarrhea, constipation, bloody or tarry stools, vomiting, fevers, chills, chest pain, shortness of breath, flank pain, back pain.          Nursing Notes were reviewed.    PAST MEDICAL HISTORY     Past Medical History:   Diagnosis Date    Abdominal distension (gaseous) 07/13/2018    Abdominal bloating    Abdominal distension (gaseous) 07/13/2018    Abdominal bloating    Abdominal distension (gaseous) 07/18/2018    Abdominal bloating    Abdominal distension (gaseous) 07/18/2018    Abdominal bloating    Abnormal levels of other serum enzymes     Elevated liver enzymes    Allergic rhinitis, unspecified 05/29/2020    Allergic rhinitis with postnasal drip    COVID-19 07/20/2022    COVID-19    Decreased white blood cell count, unspecified 05/29/2020    Leukopenia    Demyelinating disease of central nervous system, unspecified (CMS/[facility]) 10/14/2019    Demyelinating disorder    Diarrhea, unspecified 07/13/2018    Acute diarrhea     Diarrhea, unspecified 07/13/2018    Acute diarrhea    Diarrhea, unspecified 07/18/2018    Acute diarrhea    Diarrhea, unspecified 07/18/2018    Acute diarrhea    Disease of stomach and duodenum, unspecified 07/13/2018    Subepithelial gastric lesion    Disease of stomach and duodenum, unspecified 07/13/2018    Subepithelial gastric lesion    Disease of stomach and duodenum, unspecified 07/18/2018    Subepithelial gastric lesion    Disease of stomach and duodenum, unspecified 07/18/2018    Subepithelial gastric lesion    Disorder of the skin and subcutaneous tissue, unspecified 09/05/2019    Skin abnormality    Essential (primary) hypertension 07/20/2022    HTN (hypertension), benign    Helicobacter pylori (H. pylori) as the cause of diseases classified elsewhere 10/11/2019    Helicobacter pylori [H. pylori] as the cause of diseases classified elsewhere    Impacted cerumen, left ear 12/19/2019    Impacted cerumen of left ear    Incomplete defecation 09/21/2020    Incomplete defecation    Iron deficiency     Low iron    Left upper quadrant pain 09/21/2020    Left upper quadrant abdominal pain    Long term (current) use of non-steroidal anti-inflammatories (nsaid) 06/29/2021    NSAID long-term use    Malabsorption due to intolerance, not elsewhere classified 07/13/2018    PLE (protein-losing enteropathy)    Malabsorption due to intolerance, not elsewhere classified 07/13/2018    PLE (protein-losing enteropathy)    Malabsorption due to intolerance, not elsewhere classified 07/18/2018    PLE (protein-losing enteropathy)    Malabsorption due to intolerance, not elsewhere classified 07/18/2018    PLE (protein-losing enteropathy)    Myopia, bilateral 05/24/2021    Bilateral myopia    Nontoxic multinodular goiter 03/26/2021    Multinodular goiter    Other chest pain 10/02/2020    Chest pain, non-cardiac    Other constipation 07/13/2018    Chronic constipation    Other constipation 07/13/2018    Chronic constipation    Other  constipation 07/18/2018    Chronic constipation    Other constipation 07/18/2018    Chronic constipation    Other diseases of stomach and duodenum 07/13/2018    Gastric nodule    Other diseases of stomach and duodenum 07/13/2018    Gastric nodule    Other diseases of stomach and duodenum 07/18/2018    Gastric nodule    Other diseases of stomach and duodenum 07/18/2018    Gastric nodule    Other forms of dyspnea 01/10/2021    Exertional dyspnea    Other microscopic hematuria 07/13/2018    Microscopic hematuria    Other microscopic hematuria 07/13/2018    Microscopic hematuria    Other microscopic hematuria 07/18/2018    Microscopic hematuria    Other microscopic hematuria 07/18/2018    Microscopic hematuria    Otitis media, unspecified, right ear 09/12/2019    Right otitis media    Paresthesia of skin     Prickling sensation    Personal history of other diseases of the circulatory system 05/17/2018    History of paroxysmal supraventricular tachycardia    Personal history of other diseases of the nervous system and sense organs     History of paralysis    Personal history of other diseases of the respiratory system     History of sinusitis    Personal history of other diseases of the respiratory system 10/22/2019    History of nasal obstruction    Personal history of other diseases of the respiratory system 04/08/2020    History of acute sinusitis    Personal history of other specified conditions     H/O shortness of breath    Personal history of other specified conditions 09/20/2019    History of dyspnea    Personal history of other specified conditions 05/29/2020    History of palpitations    Personal history of other specified conditions 10/02/2020    History of dizziness    Personal history of other specified conditions 10/02/2020    History of palpitations    Personal history of other venous thrombosis and embolism     H/O blood clots    Post-traumatic headache, unspecified, not intractable 09/09/2020     Post-concussion headache    Unspecified asthma, uncomplicated 09/21/2021    Asthma    Unspecified atherosclerosis of native arteries of extremities, other extremity (CMS/[facility]) 01/31/2020    Radial artery occlusion, right    Unspecified blepharitis right upper eyelid 05/24/2021    Blepharitis of right upper eyelid    Upper abdominal pain, unspecified 10/10/2018    Upper abdominal pain of unknown etiology    Upper abdominal pain, unspecified 07/13/2018    Upper abdominal pain of unknown etiology    Upper abdominal pain, unspecified 07/13/2018    Upper abdominal pain of unknown etiology    Upper abdominal pain, unspecified 07/18/2018    Upper abdominal pain of unknown etiology    Upper abdominal pain, unspecified 07/18/2018    Upper abdominal pain of unknown etiology         SURGICAL HISTORY       Past Surgical History:   Procedure Laterality Date    ADENOIDECTOMY  05/17/2018    Adenoidectomy    COLONOSCOPY  05/17/2018    Colonoscopy    CT ANGIO NECK  4/27/2021    CT NECK ANGIO W AND WO IV CONTRAST 4/27/2021 [facility]    CT HEAD ANGIO W AND WO IV CONTRAST  4/27/2021    CT HEAD ANGIO W AND WO IV CONTRAST 4/27/2021 [facility]    ESOPHAGOGASTRODUODENOSCOPY  05/17/2018    Diagnostic Esophagogastroduodenoscopy    OTHER SURGICAL HISTORY  05/17/2018    Endoscopic Ultrasound Pancreatic    OTHER SURGICAL HISTORY  05/17/2018    Append Laparosc Addl ___ Mm Port Placed In R Lower Abdomen    OTHER SURGICAL HISTORY  07/20/2022    Ganglion cyst excision    OTHER SURGICAL HISTORY  09/05/2019    Appendectomy    OTHER SURGICAL HISTORY  10/14/2019    Liver biopsy         CURRENT MEDICATIONS       Previous Medications    CYANOCOBALAMIN (VITAMIN B-12) 1,000 MCG TABLET    Take 1 tablet (1,000 mcg) by mouth once daily.    METOPROLOL SUCCINATE XL (TOPROL-XL) 25 MG 24 HR TABLET    Take 1 tablet (25 mg) by mouth once daily.    OMEPRAZOLE (PRILOSEC) 40 MG DR CAPSULE    Take 1 capsule (40 mg) by mouth if needed.        ALLERGIES     Lisinopril, Amlodipine, Prednisone, and Metronidazole    FAMILY HISTORY     No family history on file.       SOCIAL HISTORY       Social History     Socioeconomic History    Marital status: Single     Spouse name: None    Number of children: None    Years of education: None    Highest education level: None   Occupational History    None   Tobacco Use    Smoking status: Never    Smokeless tobacco: Never   Substance and Sexual Activity    Alcohol use: Not Currently    Drug use: Never    Sexual activity: None   Other Topics Concern    None   Social History Narrative    None     Social Determinants of Health     Financial Resource Strain: Not on file   Food Insecurity: Not on file   Transportation Needs: Not on file   Physical Activity: Not on file   Stress: Not on file   Social Connections: Not on file   Intimate Partner Violence: Not on file   Housing Stability: Not on file       SCREENINGS                        PHYSICAL EXAM    (up to 7 for level 4, 8 or more for level 5)     ED Triage Vitals [02/02/24 1059]   Temperature Heart Rate Respirations BP   37.4 °C (99.3 °F) 74 16 (!) 147/94      Pulse Ox Temp Source Heart Rate Source Patient Position   96 % Temporal Monitor --      BP Location FiO2 (%)     -- --       Physical Exam  Vitals reviewed.   Constitutional:       General: He is not in acute distress.     Appearance: Normal appearance. He is not toxic-appearing.   HENT:      Head: Normocephalic and atraumatic.      Nose: Nose normal.   Eyes:      Extraocular Movements: Extraocular movements intact.   Cardiovascular:      Rate and Rhythm: Normal rate and regular rhythm.      Heart sounds: No murmur heard.     No friction rub. No gallop.   Pulmonary:      Effort: Pulmonary effort is normal. No respiratory distress.      Breath sounds: Normal breath sounds. No wheezing, rhonchi or rales.   Abdominal:      General: Bowel sounds are normal.      Tenderness: There is no abdominal tenderness. There  is no right CVA tenderness, left CVA tenderness, guarding or rebound. Negative signs include Cobb's sign and McBurney's sign.   Skin:     General: Skin is warm and dry.   Neurological:      General: No focal deficit present.      Mental Status: He is alert.   Psychiatric:         Mood and Affect: Mood normal.         Behavior: Behavior normal.          DIAGNOSTIC RESULTS     LABS:  Labs Reviewed   CBC WITH AUTO DIFFERENTIAL - Abnormal       Result Value    WBC 4.3 (*)     nRBC 0.0      RBC 4.84      Hemoglobin 14.5      Hematocrit 43.6      MCV 90      MCH 30.0      MCHC 33.3      RDW 12.1      Platelets 197      Neutrophils % 59.7      Immature Granulocytes %, Automated 0.5      Lymphocytes % 28.5      Monocytes % 8.0      Eosinophils % 2.4      Basophils % 0.9      Neutrophils Absolute 2.54      Immature Granulocytes Absolute, Automated 0.02      Lymphocytes Absolute 1.21      Monocytes Absolute 0.34      Eosinophils Absolute 0.10      Basophils Absolute 0.04     COMPREHENSIVE METABOLIC PANEL - Normal    Glucose 99      Sodium 136      Potassium 4.2      Chloride 102      Bicarbonate 27      Anion Gap 11      Urea Nitrogen 17      Creatinine 0.86      eGFR >90      Calcium 9.5      Albumin 4.8      Alkaline Phosphatase 50      Total Protein 7.2      AST 19      Bilirubin, Total 0.7      ALT 16     URINALYSIS WITH REFLEX CULTURE AND MICROSCOPIC - Normal    Color, Urine Straw      Appearance, Urine Clear      Specific Gravity, Urine 1.005      pH, Urine 6.0      Protein, Urine NEGATIVE      Glucose, Urine NEGATIVE      Blood, Urine NEGATIVE      Ketones, Urine NEGATIVE      Bilirubin, Urine NEGATIVE      Urobilinogen, Urine <2.0      Nitrite, Urine NEGATIVE      Leukocyte Esterase, Urine NEGATIVE     URINALYSIS WITH REFLEX CULTURE AND MICROSCOPIC    Narrative:     The following orders were created for panel order Urinalysis with Reflex Culture and Microscopic.  Procedure                               Abnormality          Status                     ---------                               -----------         ------                     Urinalysis with Reflex C...[[id number]]  Normal              Final result               Extra Urine Gray Tube[[id number]]                                                         Please view results for these tests on the individual orders.   EXTRA URINE GRAY TUBE   URINE GRAY TUBE       All other labs were within normal range or not returned as of this dictation.    Imaging  CT abdomen pelvis w IV contrast   Final Result   Diverticulosis without diverticulitis.   No acute process.   Signed by Paolo Arroyo MD           Procedures  Procedures     EMERGENCY DEPARTMENT COURSE/MDM:     ED Course as of 02/02/24 1509   Fri Feb 02, 2024   1224 Urinalysis negative no signs of infection.  CMP unremarkable electrolytes within normal limits, kidney function normal.  CBC significant for leukopenia of 4.3 otherwise unremarkable. [BL]   1439 CT abdomen pelvis w IV contrast  Diverticulosis without diverticulitis.  No acute process.   [BL]   1502 Results of lab work and imaging with patient and provided ED return precautions.  Patient stable for discharge at this time close follow-up with his PCP. [BL]      ED Course User Index  [BL] Sherwin Butterfield,          Diagnoses as of 02/02/24 1509   Generalized abdominal pain        Medical Decision Making      Patient and or family in agreement and understanding of treatment plan.  All questions answered.      I reviewed the case with the attending ED physician. The attending ED physician agrees with the plan. Patient and/or patient´s representative was counseled regarding labs, imaging, likely diagnosis, and plan. All questions were answered.    ED Medications administered this visit:    Medications   iohexol (OMNIPaque) 350 mg iodine/mL solution 75 mL (75 mL intravenous Given 2/2/24 1341)       New Prescriptions from this visit:    New Prescriptions    No medications on  file       Follow-up:  Destinee Whittington MD  [address]  [facility]  [place] [id number] [phone]    Schedule an appointment as soon as possible for a visit           Final Impression:   1. Generalized abdominal pain          (Please note that portions of this note were completed with a voice recognition program.  Efforts were made to edit the dictations but occasionally words are mis-transcribed.)     Sherwin Butterfield, DO  Resident  02/02/24 5515

## 2024-02-02 NOTE — DISCHARGE INSTRUCTIONS
Please follow-up with your PCP within 24 to 48 hours.  If your symptoms do not improve or worsen, you develop fevers, chills, nausea, vomiting, diarrhea, chest pain, shortness of breath please return to the emergency department.

## 2024-02-08 ENCOUNTER — APPOINTMENT (OUTPATIENT)
Dept: PRIMARY CARE | Facility: CLINIC | Age: 59
End: 2024-02-08
Payer: MEDICAID

## 2024-02-13 ENCOUNTER — APPOINTMENT (OUTPATIENT)
Dept: RADIOLOGY | Facility: HOSPITAL | Age: 59
End: 2024-02-13
Payer: MEDICAID

## 2024-02-13 ENCOUNTER — APPOINTMENT (OUTPATIENT)
Dept: CARDIOLOGY | Facility: HOSPITAL | Age: 59
End: 2024-02-13
Payer: MEDICAID

## 2024-02-20 ENCOUNTER — APPOINTMENT (OUTPATIENT)
Dept: CARDIOLOGY | Facility: CLINIC | Age: 59
End: 2024-02-20
Payer: MEDICAID

## 2024-02-23 ENCOUNTER — OFFICE VISIT (OUTPATIENT)
Dept: PRIMARY CARE | Facility: CLINIC | Age: 59
End: 2024-02-23
Payer: MEDICAID

## 2024-02-23 VITALS
OXYGEN SATURATION: 98 % | WEIGHT: 186.6 LBS | SYSTOLIC BLOOD PRESSURE: 124 MMHG | HEART RATE: 80 BPM | DIASTOLIC BLOOD PRESSURE: 88 MMHG | BODY MASS INDEX: 25.27 KG/M2 | HEIGHT: 72 IN

## 2024-02-23 DIAGNOSIS — F41.9 ANXIETY: Primary | ICD-10-CM

## 2024-02-23 DIAGNOSIS — J45.30 MILD PERSISTENT ASTHMA, UNSPECIFIED WHETHER COMPLICATED (HHS-HCC): ICD-10-CM

## 2024-02-23 PROCEDURE — 1036F TOBACCO NON-USER: CPT | Performed by: INTERNAL MEDICINE

## 2024-02-23 PROCEDURE — 3074F SYST BP LT 130 MM HG: CPT | Performed by: INTERNAL MEDICINE

## 2024-02-23 PROCEDURE — 3079F DIAST BP 80-89 MM HG: CPT | Performed by: INTERNAL MEDICINE

## 2024-02-23 PROCEDURE — 99214 OFFICE O/P EST MOD 30 MIN: CPT | Performed by: INTERNAL MEDICINE

## 2024-02-23 RX ORDER — ESCITALOPRAM OXALATE 10 MG/1
10 TABLET ORAL DAILY
Qty: 30 TABLET | Refills: 5 | Status: SHIPPED | OUTPATIENT
Start: 2024-02-23 | End: 2024-05-09 | Stop reason: WASHOUT

## 2024-03-07 DIAGNOSIS — K21.9 GASTROESOPHAGEAL REFLUX DISEASE WITHOUT ESOPHAGITIS: Primary | ICD-10-CM

## 2024-03-07 RX ORDER — OMEPRAZOLE 20 MG/1
20 CAPSULE, DELAYED RELEASE ORAL DAILY
Qty: 30 CAPSULE | Refills: 11 | Status: SHIPPED | OUTPATIENT
Start: 2024-03-07 | End: 2024-05-09 | Stop reason: DRUGHIGH

## 2024-03-08 DIAGNOSIS — J45.20 MILD INTERMITTENT ASTHMA WITHOUT COMPLICATION (HHS-HCC): Primary | ICD-10-CM

## 2024-03-08 RX ORDER — ALBUTEROL SULFATE 90 UG/1
2 AEROSOL, METERED RESPIRATORY (INHALATION) EVERY 6 HOURS PRN
Qty: 18 G | Refills: 11 | Status: SHIPPED | OUTPATIENT
Start: 2024-03-08 | End: 2024-03-28 | Stop reason: WASHOUT

## 2024-03-08 RX ORDER — FLUTICASONE PROPIONATE 110 UG/1
1 AEROSOL, METERED RESPIRATORY (INHALATION)
Qty: 12 G | Refills: 5 | Status: SHIPPED | OUTPATIENT
Start: 2024-03-08 | End: 2024-03-28 | Stop reason: WASHOUT

## 2024-03-15 DIAGNOSIS — M79.10 MUSCULAR ACHES: Primary | ICD-10-CM

## 2024-03-15 DIAGNOSIS — M25.50 ARTHRALGIA, UNSPECIFIED JOINT: ICD-10-CM

## 2024-03-16 ENCOUNTER — LAB (OUTPATIENT)
Dept: LAB | Facility: LAB | Age: 59
End: 2024-03-16
Payer: MEDICAID

## 2024-03-16 DIAGNOSIS — M25.50 ARTHRALGIA, UNSPECIFIED JOINT: ICD-10-CM

## 2024-03-16 DIAGNOSIS — M79.10 MUSCULAR ACHES: ICD-10-CM

## 2024-03-16 LAB
25(OH)D3 SERPL-MCNC: 32 NG/ML (ref 30–100)
CRP SERPL-MCNC: 0.18 MG/DL
ERYTHROCYTE [DISTWIDTH] IN BLOOD BY AUTOMATED COUNT: 12.1 % (ref 11.5–14.5)
ERYTHROCYTE [SEDIMENTATION RATE] IN BLOOD BY WESTERGREN METHOD: 1 MM/H (ref 0–20)
HCT VFR BLD AUTO: 42.6 % (ref 41–52)
HGB BLD-MCNC: 13.9 G/DL (ref 13.5–17.5)
MCH RBC QN AUTO: 29.6 PG (ref 26–34)
MCHC RBC AUTO-ENTMCNC: 32.6 G/DL (ref 32–36)
MCV RBC AUTO: 91 FL (ref 80–100)
NRBC BLD-RTO: 0 /100 WBCS (ref 0–0)
PLATELET # BLD AUTO: 201 X10*3/UL (ref 150–450)
RBC # BLD AUTO: 4.69 X10*6/UL (ref 4.5–5.9)
WBC # BLD AUTO: 3.8 X10*3/UL (ref 4.4–11.3)

## 2024-03-16 PROCEDURE — 86140 C-REACTIVE PROTEIN: CPT

## 2024-03-16 PROCEDURE — 82306 VITAMIN D 25 HYDROXY: CPT

## 2024-03-16 PROCEDURE — 85027 COMPLETE CBC AUTOMATED: CPT

## 2024-03-16 PROCEDURE — 85652 RBC SED RATE AUTOMATED: CPT

## 2024-03-16 PROCEDURE — 36415 COLL VENOUS BLD VENIPUNCTURE: CPT

## 2024-03-28 ENCOUNTER — OFFICE VISIT (OUTPATIENT)
Dept: PRIMARY CARE | Facility: CLINIC | Age: 59
End: 2024-03-28
Payer: MEDICAID

## 2024-03-28 VITALS
HEIGHT: 72 IN | BODY MASS INDEX: 26.22 KG/M2 | DIASTOLIC BLOOD PRESSURE: 92 MMHG | OXYGEN SATURATION: 97 % | SYSTOLIC BLOOD PRESSURE: 138 MMHG | HEART RATE: 83 BPM | WEIGHT: 193.6 LBS

## 2024-03-28 DIAGNOSIS — D72.819 LEUKOPENIA, UNSPECIFIED TYPE: ICD-10-CM

## 2024-03-28 DIAGNOSIS — Z13.6 SCREENING FOR CARDIOVASCULAR CONDITION: ICD-10-CM

## 2024-03-28 DIAGNOSIS — R07.9 CHEST PAIN, UNSPECIFIED TYPE: Primary | ICD-10-CM

## 2024-03-28 DIAGNOSIS — F41.9 ANXIETY: ICD-10-CM

## 2024-03-28 PROCEDURE — 3080F DIAST BP >= 90 MM HG: CPT | Performed by: INTERNAL MEDICINE

## 2024-03-28 PROCEDURE — 99214 OFFICE O/P EST MOD 30 MIN: CPT | Performed by: INTERNAL MEDICINE

## 2024-03-28 PROCEDURE — 3075F SYST BP GE 130 - 139MM HG: CPT | Performed by: INTERNAL MEDICINE

## 2024-03-28 NOTE — PROGRESS NOTES
Subjective   Patient ID: [id number]     Robin Nguyễn is a 58 y.o. male who presents for 6 week follow up, Neutropenia, Anxiety (Started on Lexapro last visit ), and Chest Pain (Would like to discuss MRI ).    Current Outpatient Medications:     cyanocobalamin (Vitamin B-12) 1,000 mcg tablet, Take 1 tablet (1,000 mcg) by mouth once daily., Disp: , Rfl:     escitalopram (Lexapro) 10 mg tablet, Take 1 tablet (10 mg) by mouth once daily., Disp: 30 tablet, Rfl: 5    metoprolol succinate XL (Toprol-XL) 25 mg 24 hr tablet, Take 1 tablet (25 mg) by mouth once daily., Disp: , Rfl:     omeprazole (PriLOSEC) 20 mg DR capsule, Take 1 capsule (20 mg) by mouth once daily. Do not crush or chew., Disp: 30 capsule, Rfl: 11    albuterol 90 mcg/actuation inhaler, Inhale 2 puffs every 6 hours if needed for wheezing., Disp: 18 g, Rfl: 11    fluticasone (Flovent) 110 mcg/actuation inhaler, Inhale 1 puff 2 times a day. Rinse mouth with water after use to reduce aftertaste and incidence of candidiasis. Do not swallow., Disp: 12 g, Rfl: 5  HPI  58-year-old patient presented to the office today for follow-up visit.  I started the patient on Lexapro for his anxiety.  He took it for 1 week and he then decided to take himself off of it.  He is sleepy, but the medicine fine and has no side effect more hyper on the medication he stated.  He is not sure he has anxiety despite the fact that they told him there is underlying anxiety and his medical condition.  He stated that if he stops having chest pain he will feel a lot better.  He has been investigated thoroughly with cardiology and [facility] and gastroenterology.  Today we did discuss that we are going to proceed with further evaluation with cardiology [facility] and an echocardiogram and cardiac calcium scoring.  Review of system was reviewed all normal except what is noted in HPI   Past Medical History:   Diagnosis Date    Abdominal distension (gaseous) 07/13/2018     Abdominal bloating    Abdominal distension (gaseous) 07/13/2018    Abdominal bloating    Abdominal distension (gaseous) 07/18/2018    Abdominal bloating    Abdominal distension (gaseous) 07/18/2018    Abdominal bloating    Abnormal levels of other serum enzymes     Elevated liver enzymes    Allergic rhinitis, unspecified 05/29/2020    Allergic rhinitis with postnasal drip    COVID-19 07/20/2022    COVID-19    Decreased white blood cell count, unspecified 05/29/2020    Leukopenia    Demyelinating disease of central nervous system, unspecified (CMS/[facility]) 10/14/2019    Demyelinating disorder    Diarrhea, unspecified 07/13/2018    Acute diarrhea    Diarrhea, unspecified 07/13/2018    Acute diarrhea    Diarrhea, unspecified 07/18/2018    Acute diarrhea    Diarrhea, unspecified 07/18/2018    Acute diarrhea    Disease of stomach and duodenum, unspecified 07/13/2018    Subepithelial gastric lesion    Disease of stomach and duodenum, unspecified 07/13/2018    Subepithelial gastric lesion    Disease of stomach and duodenum, unspecified 07/18/2018    Subepithelial gastric lesion    Disease of stomach and duodenum, unspecified 07/18/2018    Subepithelial gastric lesion    Disorder of the skin and subcutaneous tissue, unspecified 09/05/2019    Skin abnormality    Essential (primary) hypertension 07/20/2022    HTN (hypertension), benign    Helicobacter pylori (H. pylori) as the cause of diseases classified elsewhere 10/11/2019    Helicobacter pylori [H. pylori] as the cause of diseases classified elsewhere    Impacted cerumen, left ear 12/19/2019    Impacted cerumen of left ear    Incomplete defecation 09/21/2020    Incomplete defecation    Iron deficiency     Low iron    Left upper quadrant pain 09/21/2020    Left upper quadrant abdominal pain    Long term (current) use of non-steroidal anti-inflammatories (nsaid) 06/29/2021    NSAID long-term use    Malabsorption due to intolerance, not elsewhere classified 07/13/2018    PLE  (protein-losing enteropathy)    Malabsorption due to intolerance, not elsewhere classified 07/13/2018    PLE (protein-losing enteropathy)    Malabsorption due to intolerance, not elsewhere classified 07/18/2018    PLE (protein-losing enteropathy)    Malabsorption due to intolerance, not elsewhere classified 07/18/2018    PLE (protein-losing enteropathy)    Myopia, bilateral 05/24/2021    Bilateral myopia    Nontoxic multinodular goiter 03/26/2021    Multinodular goiter    Other chest pain 10/02/2020    Chest pain, non-cardiac    Other constipation 07/13/2018    Chronic constipation    Other constipation 07/13/2018    Chronic constipation    Other constipation 07/18/2018    Chronic constipation    Other constipation 07/18/2018    Chronic constipation    Other diseases of stomach and duodenum 07/13/2018    Gastric nodule    Other diseases of stomach and duodenum 07/13/2018    Gastric nodule    Other diseases of stomach and duodenum 07/18/2018    Gastric nodule    Other diseases of stomach and duodenum 07/18/2018    Gastric nodule    Other forms of dyspnea 01/10/2021    Exertional dyspnea    Other microscopic hematuria 07/13/2018    Microscopic hematuria    Other microscopic hematuria 07/13/2018    Microscopic hematuria    Other microscopic hematuria 07/18/2018    Microscopic hematuria    Other microscopic hematuria 07/18/2018    Microscopic hematuria    Otitis media, unspecified, right ear 09/12/2019    Right otitis media    Paresthesia of skin     Prickling sensation    Personal history of other diseases of the circulatory system 05/17/2018    History of paroxysmal supraventricular tachycardia    Personal history of other diseases of the nervous system and sense organs     History of paralysis    Personal history of other diseases of the respiratory system     History of sinusitis    Personal history of other diseases of the respiratory system 10/22/2019    History of nasal obstruction    Personal history of other  diseases of the respiratory system 04/08/2020    History of acute sinusitis    Personal history of other specified conditions     H/O shortness of breath    Personal history of other specified conditions 09/20/2019    History of dyspnea    Personal history of other specified conditions 05/29/2020    History of palpitations    Personal history of other specified conditions 10/02/2020    History of dizziness    Personal history of other specified conditions 10/02/2020    History of palpitations    Personal history of other venous thrombosis and embolism     H/O blood clots    Post-traumatic headache, unspecified, not intractable 09/09/2020    Post-concussion headache    Unspecified asthma, uncomplicated 09/21/2021    Asthma    Unspecified atherosclerosis of native arteries of extremities, other extremity (CMS/HCC) 01/31/2020    Radial artery occlusion, right    Unspecified blepharitis right upper eyelid 05/24/2021    Blepharitis of right upper eyelid    Upper abdominal pain, unspecified 10/10/2018    Upper abdominal pain of unknown etiology    Upper abdominal pain, unspecified 07/13/2018    Upper abdominal pain of unknown etiology    Upper abdominal pain, unspecified 07/13/2018    Upper abdominal pain of unknown etiology    Upper abdominal pain, unspecified 07/18/2018    Upper abdominal pain of unknown etiology    Upper abdominal pain, unspecified 07/18/2018    Upper abdominal pain of unknown etiology      Objective   BP (!) 138/92 (BP Location: Left arm, Patient Position: Sitting)   Pulse 83   Ht 1.829 m (6')   Wt 87.8 kg (193 lb 9.6 oz)   SpO2 97%   BMI 26.26 kg/m²      Physical Exam  Constitutional:       Appearance: Normal appearance.   Cardiovascular:      Rate and Rhythm: Normal rate and regular rhythm.      Pulses: Normal pulses.      Heart sounds: Normal heart sounds.   Pulmonary:      Effort: Pulmonary effort is normal.      Breath sounds: Normal breath sounds.   Neurological:      Mental Status: He is  alert.       Assessment/Plan   Problem List Items Addressed This Visit          Mental Health    Anxiety   58-year-old patient with the following issues.    1.  Generalized anxiety disorder patient was on Lexapro that he discontinued it on his own he finally was in agreement to go and be evaluated to the [facility] to confirm his psychiatry diagnosis and to start him on the right management.  Patient stated understanding.    2.  Ongoing concerns regarding chest pain status post evaluation with [facility] in the past and status post GI evaluation, at this point I would like to obtain cardiac calcium scoring to assess for any coronary artery disease and an echocardiogram and he would like to start him back to cardiology for further evaluation.    3.  Patient is very concerned about leukopenia mild referral to hematology was provided    No other active issues or concerns.    Destinee Whittington MD

## 2024-03-29 ENCOUNTER — APPOINTMENT (OUTPATIENT)
Dept: PRIMARY CARE | Facility: CLINIC | Age: 59
End: 2024-03-29
Payer: MEDICAID

## 2024-04-08 ENCOUNTER — APPOINTMENT (OUTPATIENT)
Dept: PRIMARY CARE | Facility: CLINIC | Age: 59
End: 2024-04-08
Payer: MEDICAID

## 2024-04-12 DIAGNOSIS — M54.6 BILATERAL THORACIC BACK PAIN, UNSPECIFIED CHRONICITY: Primary | ICD-10-CM

## 2024-04-16 ENCOUNTER — APPOINTMENT (OUTPATIENT)
Dept: BEHAVIORAL HEALTH | Facility: CLINIC | Age: 59
End: 2024-04-16
Payer: MEDICAID

## 2024-04-26 ENCOUNTER — APPOINTMENT (OUTPATIENT)
Dept: RADIOLOGY | Facility: CLINIC | Age: 59
End: 2024-04-26
Payer: MEDICAID

## 2024-04-26 ENCOUNTER — APPOINTMENT (OUTPATIENT)
Dept: CARDIOLOGY | Facility: CLINIC | Age: 59
End: 2024-04-26
Payer: MEDICAID

## 2024-05-09 ENCOUNTER — OFFICE VISIT (OUTPATIENT)
Dept: PRIMARY CARE | Facility: CLINIC | Age: 59
End: 2024-05-09
Payer: MEDICAID

## 2024-05-09 ENCOUNTER — HOSPITAL ENCOUNTER (OUTPATIENT)
Dept: RADIOLOGY | Facility: CLINIC | Age: 59
Discharge: HOME | End: 2024-05-09
Payer: MEDICAID

## 2024-05-09 ENCOUNTER — LAB (OUTPATIENT)
Dept: LAB | Facility: LAB | Age: 59
End: 2024-05-09
Payer: MEDICAID

## 2024-05-09 ENCOUNTER — APPOINTMENT (OUTPATIENT)
Dept: PRIMARY CARE | Facility: CLINIC | Age: 59
End: 2024-05-09
Payer: MEDICAID

## 2024-05-09 VITALS
HEART RATE: 74 BPM | BODY MASS INDEX: 26.14 KG/M2 | SYSTOLIC BLOOD PRESSURE: 158 MMHG | WEIGHT: 193 LBS | HEIGHT: 72 IN | DIASTOLIC BLOOD PRESSURE: 92 MMHG | TEMPERATURE: 98.2 F

## 2024-05-09 DIAGNOSIS — R07.9 CHEST PAIN, UNSPECIFIED TYPE: Primary | ICD-10-CM

## 2024-05-09 DIAGNOSIS — D72.819 LEUKOPENIA, UNSPECIFIED TYPE: ICD-10-CM

## 2024-05-09 DIAGNOSIS — M54.16 LUMBAR RADICULOPATHY: ICD-10-CM

## 2024-05-09 DIAGNOSIS — K21.9 GASTROESOPHAGEAL REFLUX DISEASE WITHOUT ESOPHAGITIS: ICD-10-CM

## 2024-05-09 DIAGNOSIS — T78.40XA ALLERGY, INITIAL ENCOUNTER: ICD-10-CM

## 2024-05-09 DIAGNOSIS — F41.9 ANXIETY: ICD-10-CM

## 2024-05-09 LAB
BASOPHILS # BLD AUTO: 0.07 X10*3/UL (ref 0–0.1)
BASOPHILS NFR BLD AUTO: 1.3 %
EOSINOPHIL # BLD AUTO: 0.14 X10*3/UL (ref 0–0.7)
EOSINOPHIL NFR BLD AUTO: 2.7 %
ERYTHROCYTE [DISTWIDTH] IN BLOOD BY AUTOMATED COUNT: 12.3 % (ref 11.5–14.5)
HCT VFR BLD AUTO: 43.7 % (ref 41–52)
HGB BLD-MCNC: 14.6 G/DL (ref 13.5–17.5)
IMM GRANULOCYTES # BLD AUTO: 0.03 X10*3/UL (ref 0–0.7)
IMM GRANULOCYTES NFR BLD AUTO: 0.6 % (ref 0–0.9)
LYMPHOCYTES # BLD AUTO: 1.6 X10*3/UL (ref 1.2–4.8)
LYMPHOCYTES NFR BLD AUTO: 30.5 %
MCH RBC QN AUTO: 30.7 PG (ref 26–34)
MCHC RBC AUTO-ENTMCNC: 33.4 G/DL (ref 32–36)
MCV RBC AUTO: 92 FL (ref 80–100)
MONOCYTES # BLD AUTO: 0.47 X10*3/UL (ref 0.1–1)
MONOCYTES NFR BLD AUTO: 9 %
NEUTROPHILS # BLD AUTO: 2.94 X10*3/UL (ref 1.2–7.7)
NEUTROPHILS NFR BLD AUTO: 55.9 %
NRBC BLD-RTO: 0 /100 WBCS (ref 0–0)
PLATELET # BLD AUTO: 206 X10*3/UL (ref 150–450)
RBC # BLD AUTO: 4.76 X10*6/UL (ref 4.5–5.9)
WBC # BLD AUTO: 5.3 X10*3/UL (ref 4.4–11.3)

## 2024-05-09 PROCEDURE — 85025 COMPLETE CBC W/AUTO DIFF WBC: CPT

## 2024-05-09 PROCEDURE — 3080F DIAST BP >= 90 MM HG: CPT | Performed by: STUDENT IN AN ORGANIZED HEALTH CARE EDUCATION/TRAINING PROGRAM

## 2024-05-09 PROCEDURE — 1036F TOBACCO NON-USER: CPT | Performed by: STUDENT IN AN ORGANIZED HEALTH CARE EDUCATION/TRAINING PROGRAM

## 2024-05-09 PROCEDURE — 72100 X-RAY EXAM L-S SPINE 2/3 VWS: CPT | Performed by: RADIOLOGY

## 2024-05-09 PROCEDURE — 99215 OFFICE O/P EST HI 40 MIN: CPT | Performed by: STUDENT IN AN ORGANIZED HEALTH CARE EDUCATION/TRAINING PROGRAM

## 2024-05-09 PROCEDURE — 36415 COLL VENOUS BLD VENIPUNCTURE: CPT

## 2024-05-09 PROCEDURE — 3077F SYST BP >= 140 MM HG: CPT | Performed by: STUDENT IN AN ORGANIZED HEALTH CARE EDUCATION/TRAINING PROGRAM

## 2024-05-09 PROCEDURE — 72100 X-RAY EXAM L-S SPINE 2/3 VWS: CPT

## 2024-05-09 RX ORDER — ALPRAZOLAM 0.25 MG/1
0.25 TABLET ORAL 3 TIMES DAILY PRN
Qty: 10 TABLET | Refills: 0 | Status: SHIPPED | OUTPATIENT
Start: 2024-05-09 | End: 2025-01-04

## 2024-05-09 RX ORDER — OMEPRAZOLE 40 MG/1
40 CAPSULE, DELAYED RELEASE ORAL DAILY
Qty: 90 CAPSULE | Refills: 3 | Status: SHIPPED | OUTPATIENT
Start: 2024-05-09 | End: 2025-05-09

## 2024-05-09 RX ORDER — FLUTICASONE PROPIONATE 50 MCG
1 SPRAY, SUSPENSION (ML) NASAL DAILY
Qty: 16 G | Refills: 11 | Status: SHIPPED | OUTPATIENT
Start: 2024-05-09 | End: 2025-05-09

## 2024-05-09 ASSESSMENT — ENCOUNTER SYMPTOMS
SINUS COMPLAINT: 1
VOMITING: 0
LOSS OF SENSATION IN FEET: 0
OCCASIONAL FEELINGS OF UNSTEADINESS: 0
DIZZINESS: 0
LIGHT-HEADEDNESS: 0
FEVER: 0
SHORTNESS OF BREATH: 0
DEPRESSION: 0
NAUSEA: 0
DECREASED CONCENTRATION: 1

## 2024-05-09 ASSESSMENT — PATIENT HEALTH QUESTIONNAIRE - PHQ9
1. LITTLE INTEREST OR PLEASURE IN DOING THINGS: NOT AT ALL
2. FEELING DOWN, DEPRESSED OR HOPELESS: NOT AT ALL
SUM OF ALL RESPONSES TO PHQ9 QUESTIONS 1 AND 2: 0

## 2024-05-09 ASSESSMENT — SOCIAL DETERMINANTS OF HEALTH (SDOH)
WITHIN THE LAST YEAR, HAVE YOU BEEN AFRAID OF YOUR PARTNER OR EX-PARTNER?: NO
WITHIN THE LAST YEAR, HAVE YOU BEEN KICKED, HIT, SLAPPED, OR OTHERWISE PHYSICALLY HURT BY YOUR PARTNER OR EX-PARTNER?: NO
WITHIN THE LAST YEAR, HAVE YOU BEEN HUMILIATED OR EMOTIONALLY ABUSED IN OTHER WAYS BY YOUR PARTNER OR EX-PARTNER?: NO
WITHIN THE LAST YEAR, HAVE TO BEEN RAPED OR FORCED TO HAVE ANY KIND OF SEXUAL ACTIVITY BY YOUR PARTNER OR EX-PARTNER?: NO

## 2024-05-09 NOTE — PROGRESS NOTES
Subjective   Robin Nguyễn is a 58 y.o. male who presents for Annual Exam (Wants to discuss about white blood cell testing/Wants to discuss MRI), New Patient Visit, and Sinus Problem (Today, runny nose and headache, slight drainage).  Patient seen today to establish care.  Is a several concerns at this time.  Patient is currently in the process of transitioning care to [facility], he does follow with neurology for neuropathic pain.  He also sees GI, has had a previous EGD which showed esophageal stenosis and gastric changes.  For this he wants to get his second opinion establish with a GI specialist through the [facility].    Patient is being evaluated for neuropathic pain with neurology.  He had 1 episode of paresis bilateral lower extremities in the past, MRIs cervical, thoracic, lumbar spine are unremarkable.  Patient is interested in getting these reevaluated.  Overall today states he is asymptomatic.  He does follow with Dr. Acevedo.    Patient is previously taken Xanax for skipped beats, previous evaluated cardiology, he is interested in reestablishing with a cardiologist as his recently moved out of [facility].  Overall he has not had any recent symptoms of chest pain or exertional dyspnea.  He does complain of thoracic pain, but it does radiate from the back.  Patient has had previous workup including EKG, echocardiogram which were largely unremarkable.  Symptoms well-controlled with very intermittent Xanax.    Patient is complaining of sinus symptoms today, including yellow purulent sputum production, sinus pressure, nasal congestion.  These have been present for 1 day, patient does have significant history of allergies takes intermittent antihistamines, is interested in taking Flonase for this as well.  Patient will need refills.        Sinus Problem  Pertinent negatives include no shortness of breath.       Review of Systems   Constitutional:  Negative for fever.   Respiratory:  Negative  for shortness of breath.    Cardiovascular:  Positive for chest pain.   Gastrointestinal:  Negative for nausea and vomiting.   Neurological:  Negative for dizziness and light-headedness.   Psychiatric/Behavioral:  Positive for decreased concentration.    All other systems reviewed and are negative.      Objective   Physical Exam  Vitals reviewed.   Constitutional:       General: He is not in acute distress.     Appearance: Normal appearance. He is not toxic-appearing.   HENT:      Head: Normocephalic and atraumatic.      Nose: Congestion present.      Mouth/Throat:      Pharynx: Posterior oropharyngeal erythema present.   Eyes:      Extraocular Movements: Extraocular movements intact.   Cardiovascular:      Rate and Rhythm: Normal rate and regular rhythm.      Heart sounds: No murmur heard.     No friction rub. No gallop.   Pulmonary:      Effort: Pulmonary effort is normal. No respiratory distress.      Breath sounds: Normal breath sounds. No wheezing, rhonchi or rales.   Skin:     General: Skin is warm and dry.   Neurological:      General: No focal deficit present.      Mental Status: He is alert.   Psychiatric:         Mood and Affect: Mood normal.         Behavior: Behavior normal.         Assessment/Plan   Problem List Items Addressed This Visit       Lumbar radiculopathy    Relevant Orders    XR lumbar spine 2-3 views    GERD (gastroesophageal reflux disease)    Relevant Medications    omeprazole (PriLOSEC) 40 mg DR capsule    Other Relevant Orders    Referral to Gastroenterology    Anxiety    Relevant Medications    ALPRAZolam (Xanax) 0.25 mg tablet    Chest pain - Primary    Relevant Orders    Referral to Cardiology     Other Visit Diagnoses       Allergy, initial encounter        Relevant Medications    fluticasone (Flonase) 50 mcg/actuation nasal spray    Leukopenia, unspecified type        Relevant Orders    CBC and Auto Differential (Completed)          Continue to establish care and for multiple  concerns.    We will refer him to cardiology, gastroenterology for evaluation of thoracic pain.  Patient is already established with neurology.    We discussed imaging, we will hold off on MRI imaging at this time, we will get x-rays of the lumbar spine, consider pain management if there are no other significant neurologic findings with neurology follow-up.    Will repeat a CBC as patient did have a minor leukopenia.    We will refer to gastroenterology for esophageal stenosis and gastric changes.  Continue with omeprazole, discussed taking it daily for 60 days instead of intermittently for pain.  Patient will trial this.    For sinus symptoms likely related to allergies recommended over-the-counter cetirizine and will prescribe Flonase.    Blood pressure recheck was within normal limits, high normal continue to monitor at home.  Consider increasing metoprolol if palpitations or blood pressure increases.    Follow-up with lab results and imaging.  Follow-up in 6 months for annual wellness visit or sooner if new concerns arise.

## 2024-05-17 PROBLEM — R42 VERTIGO: Status: RESOLVED | Noted: 2024-01-19 | Resolved: 2024-05-17

## 2024-05-17 PROBLEM — K40.91 RECURRENT INGUINAL HERNIA: Status: RESOLVED | Noted: 2022-01-26 | Resolved: 2024-05-17

## 2024-05-17 PROBLEM — H52.13 MYOPIA, BILATERAL: Status: RESOLVED | Noted: 2019-04-16 | Resolved: 2024-05-17

## 2024-05-17 PROBLEM — A08.4 GASTROENTERITIS AND COLITIS, VIRAL: Status: RESOLVED | Noted: 2023-07-17 | Resolved: 2024-05-17

## 2024-05-17 PROBLEM — M54.2 NECK PAIN: Status: ACTIVE | Noted: 2024-01-19

## 2024-05-17 PROBLEM — F51.01 PRIMARY INSOMNIA: Status: RESOLVED | Noted: 2024-01-19 | Resolved: 2024-05-17

## 2024-05-17 PROBLEM — M54.16 LUMBAR RADICULOPATHY: Status: RESOLVED | Noted: 2024-01-19 | Resolved: 2024-05-17

## 2024-05-17 PROBLEM — I49.1 PREMATURE ATRIAL CONTRACTION: Status: ACTIVE | Noted: 2024-01-26

## 2024-05-17 PROBLEM — G43.909 MIGRAINE: Status: RESOLVED | Noted: 2024-01-19 | Resolved: 2024-05-17

## 2024-05-17 PROBLEM — J34.2 DEVIATED NASAL SEPTUM: Status: RESOLVED | Noted: 2022-01-26 | Resolved: 2024-05-17

## 2024-05-17 PROBLEM — I99.8 VASCULAR INSUFFICIENCY: Status: RESOLVED | Noted: 2022-01-26 | Resolved: 2024-05-17

## 2024-05-17 PROBLEM — F41.9 ANXIETY: Status: RESOLVED | Noted: 2024-01-19 | Resolved: 2024-05-17

## 2024-05-17 PROBLEM — K21.9 GERD (GASTROESOPHAGEAL REFLUX DISEASE): Status: RESOLVED | Noted: 2022-12-01 | Resolved: 2024-05-17

## 2024-05-21 ENCOUNTER — OFFICE VISIT (OUTPATIENT)
Dept: NEUROLOGY | Facility: CLINIC | Age: 59
End: 2024-05-21
Payer: MEDICAID

## 2024-05-21 DIAGNOSIS — G43.109 MIGRAINE WITH AURA AND WITHOUT STATUS MIGRAINOSUS, NOT INTRACTABLE: ICD-10-CM

## 2024-05-21 DIAGNOSIS — G44.229 CHRONIC TENSION-TYPE HEADACHE, NOT INTRACTABLE: Primary | ICD-10-CM

## 2024-05-21 PROCEDURE — 99214 OFFICE O/P EST MOD 30 MIN: CPT | Performed by: STUDENT IN AN ORGANIZED HEALTH CARE EDUCATION/TRAINING PROGRAM

## 2024-05-21 RX ORDER — ESCITALOPRAM OXALATE 10 MG/1
10 TABLET ORAL
COMMUNITY
Start: 2024-02-23 | End: 2024-08-21

## 2024-05-21 RX ORDER — SUMATRIPTAN SUCCINATE 100 MG/1
100 TABLET ORAL DAILY PRN
Qty: 9 TABLET | Refills: 11 | Status: SHIPPED | OUTPATIENT
Start: 2024-05-21 | End: 2025-05-21

## 2024-05-21 NOTE — PROGRESS NOTES
Subjective     Robin Nguyễn is a 58 y.o. year old male for follow-up of headaches.     He was last seen 9/22/2021. He has a history of chronic headaches with associated balance difficulty, dizziness. He also has ocular migraines (vibration in his field of vision -> central visual field loss, 1-2 x week then resolves for months at a time). MRI chintan without contrast done 10/14/2019, 9/19/2020, CTA head and neck 4/27/2021 showed no structural abnormalities. He previously tried topiramate for headaches.     He was doing very well until yesterday. He developed a dizzy attack and then forehead pressure. He took tylenol. Then today, he woke up with his visual aura and had a pressure sensation on the top of his head. Otherwise, he was doing very well in regards to his migraines working 7 days a week. He took aspirin, metoprolol and zurtec with some improvement. He took sumatriptan in the past, found this beneficial in the past.    He was hospitalized several times for diverticulitis and abdominal abscess. He was on IV antibiotics.     Patient Active Problem List   Diagnosis    Vitamin D deficiency    Shortness of breath    Acute diverticulitis    Microscopic hematuria    Hyperlipidemia    Essential hypertension    Chest pain    Atrial tachycardia (CMS-HCC)    Mild persistent asthma, unspecified whether complicated (HHS-HCC)    Premature atrial contraction    Neck pain       Objective   Neurological Exam  Mental Status  Awake, alert and oriented to person, place and time. Speech is normal.    Cranial Nerves  CN III, IV, VI: Extraocular movements intact bilaterally.  CN VII: Full and symmetric facial movement.  CN VIII: Hearing is normal.    Motor   Strength is 5/5 throughout all four extremities.    Gait  Casual gait is normal including stance, stride, and arm swing.    Physical Exam  Eyes:      Extraocular Movements: Extraocular movements intact.   Neurological:      Motor: Motor strength is normal.  Psychiatric:          Speech: Speech normal.       Assessment/Plan   Diagnoses and all orders for this visit:  Chronic tension-type headache, not intractable  Migraine with aura and without status migrainosus, not intractable    Episodic migraine headache with aura: will restart sumatriptan 100mg daily PRN for abortive treatment. Can continue NSAIDs and acetaminophen for abortive treatment as well. Headaches are infrequent, does not require preventative therapy    Follow-up in 1 year

## 2024-05-23 ENCOUNTER — APPOINTMENT (OUTPATIENT)
Dept: CARDIOLOGY | Facility: CLINIC | Age: 59
End: 2024-05-23
Payer: MEDICAID

## 2024-05-29 ENCOUNTER — APPOINTMENT (OUTPATIENT)
Dept: RADIOLOGY | Facility: CLINIC | Age: 59
End: 2024-05-29
Payer: MEDICAID

## 2024-06-07 ENCOUNTER — APPOINTMENT (OUTPATIENT)
Dept: CARDIOLOGY | Facility: CLINIC | Age: 59
End: 2024-06-07
Payer: MEDICAID

## 2024-06-10 ENCOUNTER — TELEMEDICINE (OUTPATIENT)
Dept: PRIMARY CARE | Facility: CLINIC | Age: 59
End: 2024-06-10
Payer: MEDICAID

## 2024-06-10 DIAGNOSIS — J01.10 ACUTE NON-RECURRENT FRONTAL SINUSITIS: Primary | ICD-10-CM

## 2024-06-10 PROCEDURE — 1036F TOBACCO NON-USER: CPT | Performed by: STUDENT IN AN ORGANIZED HEALTH CARE EDUCATION/TRAINING PROGRAM

## 2024-06-10 PROCEDURE — 99213 OFFICE O/P EST LOW 20 MIN: CPT | Performed by: STUDENT IN AN ORGANIZED HEALTH CARE EDUCATION/TRAINING PROGRAM

## 2024-06-10 RX ORDER — AMOXICILLIN AND CLAVULANATE POTASSIUM 875; 125 MG/1; MG/1
1 TABLET, FILM COATED ORAL 2 TIMES DAILY
Qty: 14 TABLET | Refills: 0 | Status: SHIPPED | OUTPATIENT
Start: 2024-06-10 | End: 2024-06-17

## 2024-06-10 ASSESSMENT — ENCOUNTER SYMPTOMS
SHORTNESS OF BREATH: 0
NAUSEA: 0
VOMITING: 0
DIZZINESS: 0
LIGHT-HEADEDNESS: 0
FEVER: 0

## 2024-06-10 NOTE — PROGRESS NOTES
Subjective   Robin Nguyễn is a 58 y.o. male who presents for Sinusitis (Pt to do virtual visit today for possible infection. Pt stated that he has a lot nasal drainage).  Patient seen virtually today for sinus symptoms.  This started approximately 2 days prior, sinus pain congestion, postnasal drip and yellow sputum discharge.  Denies any fevers, does have significant history of allergies, allergy symptoms been persistent for past 1 to 2 weeks, he has been using Flonase previously.  His not started.  When allergies do occur he does use Flonase as well as Zyrtec.  He has not tried these yet for these symptoms.  We will start this currently.    Denies any chest pain, chest congestion, fevers, chills, nausea, vomiting, diarrhea currently.    Sinusitis  Pertinent negatives include no shortness of breath.       Review of Systems   Constitutional:  Negative for fever.   Respiratory:  Negative for shortness of breath.    Cardiovascular:  Negative for chest pain.   Gastrointestinal:  Negative for nausea and vomiting.   Neurological:  Negative for dizziness and light-headedness.   All other systems reviewed and are negative.      Objective   Physical Exam  Constitutional:       Comments: Seen via virtual exam         Assessment/Plan   Problem List Items Addressed This Visit    None  Visit Diagnoses       Acute non-recurrent frontal sinusitis    -  Primary    Relevant Medications    amoxicillin-pot clavulanate (Augmentin) 875-125 mg tablet          Patient seen today for acute sinusitis, allergic versus bacterial.  Will trial Flonase and Zyrtec, and if symptoms persist patient will start Augmentin.    Follow-up if symptoms persist or worsen over the next 2 to 4 weeks consider chest x-ray at that time.

## 2024-06-11 ENCOUNTER — LAB (OUTPATIENT)
Dept: LAB | Facility: LAB | Age: 59
End: 2024-06-11
Payer: MEDICAID

## 2024-06-11 ENCOUNTER — HOSPITAL ENCOUNTER (OUTPATIENT)
Dept: RADIOLOGY | Facility: CLINIC | Age: 59
Discharge: HOME | End: 2024-06-11
Payer: MEDICAID

## 2024-06-11 DIAGNOSIS — J01.10 ACUTE NON-RECURRENT FRONTAL SINUSITIS: ICD-10-CM

## 2024-06-11 DIAGNOSIS — J01.10 ACUTE NON-RECURRENT FRONTAL SINUSITIS: Primary | ICD-10-CM

## 2024-06-11 PROCEDURE — 87635 SARS-COV-2 COVID-19 AMP PRB: CPT

## 2024-06-11 PROCEDURE — 71046 X-RAY EXAM CHEST 2 VIEWS: CPT | Performed by: RADIOLOGY

## 2024-06-11 PROCEDURE — 71046 X-RAY EXAM CHEST 2 VIEWS: CPT

## 2024-06-12 LAB — SARS-COV-2 RNA RESP QL NAA+PROBE: NOT DETECTED

## 2024-06-14 ENCOUNTER — OFFICE VISIT (OUTPATIENT)
Dept: PRIMARY CARE | Facility: CLINIC | Age: 59
End: 2024-06-14
Payer: MEDICAID

## 2024-06-14 VITALS
TEMPERATURE: 99.1 F | SYSTOLIC BLOOD PRESSURE: 135 MMHG | WEIGHT: 195.38 LBS | DIASTOLIC BLOOD PRESSURE: 91 MMHG | OXYGEN SATURATION: 96 % | RESPIRATION RATE: 18 BRPM | HEART RATE: 74 BPM | BODY MASS INDEX: 26.5 KG/M2

## 2024-06-14 DIAGNOSIS — J01.10 ACUTE NON-RECURRENT FRONTAL SINUSITIS: Primary | ICD-10-CM

## 2024-06-14 PROCEDURE — 3075F SYST BP GE 130 - 139MM HG: CPT | Performed by: STUDENT IN AN ORGANIZED HEALTH CARE EDUCATION/TRAINING PROGRAM

## 2024-06-14 PROCEDURE — 1036F TOBACCO NON-USER: CPT | Performed by: STUDENT IN AN ORGANIZED HEALTH CARE EDUCATION/TRAINING PROGRAM

## 2024-06-14 PROCEDURE — 99213 OFFICE O/P EST LOW 20 MIN: CPT | Performed by: STUDENT IN AN ORGANIZED HEALTH CARE EDUCATION/TRAINING PROGRAM

## 2024-06-14 PROCEDURE — 3080F DIAST BP >= 90 MM HG: CPT | Performed by: STUDENT IN AN ORGANIZED HEALTH CARE EDUCATION/TRAINING PROGRAM

## 2024-06-14 RX ORDER — METHYLPREDNISOLONE 4 MG/1
TABLET ORAL
Qty: 21 TABLET | Refills: 0 | Status: SHIPPED | OUTPATIENT
Start: 2024-06-14 | End: 2024-06-21

## 2024-06-14 ASSESSMENT — ENCOUNTER SYMPTOMS
FEVER: 0
SHORTNESS OF BREATH: 0
VOMITING: 0
LIGHT-HEADEDNESS: 0
DIZZINESS: 0
NAUSEA: 0

## 2024-06-14 NOTE — PROGRESS NOTES
Subjective   Robin Nguyễn is a 58 y.o. male who presents for Follow-up (Pt here today to F/U for sinus infection).  Patient seen in follow-up URI.  Was diagnosed with sinusitis particularly on Monday was prescribed Augmentin.  Recommended Flonase staying hydrated.    Overall he was doing better but had an episode of significant dizziness, he has had this twice before with sinus infections.  States no significant changes since being on Augmentin.    Denies any chest pain, shortness of breath, lightheadedness, dizziness, weakness.        Review of Systems   Constitutional:  Negative for fever.   Respiratory:  Negative for shortness of breath.    Cardiovascular:  Negative for chest pain.   Gastrointestinal:  Negative for nausea and vomiting.   Neurological:  Negative for dizziness and light-headedness.   All other systems reviewed and are negative.      Objective   Physical Exam  Vitals reviewed.   Constitutional:       General: He is not in acute distress.     Appearance: Normal appearance. He is not toxic-appearing.   HENT:      Head: Normocephalic and atraumatic.      Left Ear: There is impacted cerumen.      Nose: Nose normal.   Eyes:      Extraocular Movements: Extraocular movements intact.   Cardiovascular:      Rate and Rhythm: Normal rate and regular rhythm.      Heart sounds: No murmur heard.     No friction rub. No gallop.   Pulmonary:      Effort: Pulmonary effort is normal. No respiratory distress.      Breath sounds: Normal breath sounds. No wheezing, rhonchi or rales.   Skin:     General: Skin is warm and dry.   Neurological:      General: No focal deficit present.      Mental Status: He is alert.   Psychiatric:         Mood and Affect: Mood normal.         Behavior: Behavior normal.         Assessment/Plan   Problem List Items Addressed This Visit    None  Visit Diagnoses       Acute non-recurrent frontal sinusitis    -  Primary    Relevant Medications    methylPREDNISolone (Medrol Dospak) 4 mg  tablets        Patient seen today for persistent sinus symptoms.      Recommended that he complete Augmentin.    Continue with Medrol Dosepak.    Removed impacted cerumen from left ear canal which can be attributing to dizziness.    Follow-up on Monday if any new symptoms occur.

## 2024-06-27 DIAGNOSIS — J01.10 ACUTE NON-RECURRENT FRONTAL SINUSITIS: Primary | ICD-10-CM

## 2024-06-27 RX ORDER — DOXYCYCLINE 100 MG/1
100 CAPSULE ORAL 2 TIMES DAILY
Qty: 14 CAPSULE | Refills: 0 | Status: SHIPPED | OUTPATIENT
Start: 2024-06-27 | End: 2024-07-04

## 2024-07-17 DIAGNOSIS — F41.9 ANXIETY: ICD-10-CM

## 2024-07-17 DIAGNOSIS — E53.8 VITAMIN B12 DEFICIENCY: Primary | ICD-10-CM

## 2024-07-17 RX ORDER — ALPRAZOLAM 0.25 MG/1
0.25 TABLET ORAL 3 TIMES DAILY PRN
Qty: 30 TABLET | Refills: 2 | Status: SHIPPED | OUTPATIENT
Start: 2024-07-17 | End: 2025-03-14

## 2024-07-17 RX ORDER — LANOLIN ALCOHOL/MO/W.PET/CERES
1000 CREAM (GRAM) TOPICAL DAILY
Qty: 90 TABLET | Refills: 0 | Status: SHIPPED | OUTPATIENT
Start: 2024-07-17

## 2024-07-23 DIAGNOSIS — G44.229 CHRONIC TENSION-TYPE HEADACHE, NOT INTRACTABLE: Primary | ICD-10-CM

## 2024-07-23 RX ORDER — AMITRIPTYLINE HYDROCHLORIDE 10 MG/1
TABLET, FILM COATED ORAL
Qty: 90 TABLET | Refills: 5 | Status: SHIPPED | OUTPATIENT
Start: 2024-07-23

## 2024-07-24 DIAGNOSIS — G43.109 MIGRAINE WITH AURA AND WITHOUT STATUS MIGRAINOSUS, NOT INTRACTABLE: Primary | ICD-10-CM

## 2024-07-29 ENCOUNTER — APPOINTMENT (OUTPATIENT)
Dept: RADIOLOGY | Facility: HOSPITAL | Age: 59
End: 2024-07-29
Payer: MEDICAID

## 2024-07-30 ENCOUNTER — HOSPITAL ENCOUNTER (OUTPATIENT)
Dept: RADIOLOGY | Facility: HOSPITAL | Age: 59
Discharge: HOME | End: 2024-07-30
Payer: MEDICAID

## 2024-07-30 DIAGNOSIS — G43.109 MIGRAINE WITH AURA AND WITHOUT STATUS MIGRAINOSUS, NOT INTRACTABLE: ICD-10-CM

## 2024-07-30 PROCEDURE — 70551 MRI BRAIN STEM W/O DYE: CPT

## 2024-07-30 PROCEDURE — 70551 MRI BRAIN STEM W/O DYE: CPT | Performed by: RADIOLOGY

## 2024-07-30 RX ORDER — GADOTERATE MEGLUMINE 376.9 MG/ML
0.2 INJECTION INTRAVENOUS
Status: DISCONTINUED | OUTPATIENT
Start: 2024-07-30 | End: 2024-07-31 | Stop reason: HOSPADM

## 2024-08-08 ENCOUNTER — APPOINTMENT (OUTPATIENT)
Dept: RADIOLOGY | Facility: HOSPITAL | Age: 59
End: 2024-08-08
Payer: MEDICAID

## 2024-11-04 ENCOUNTER — LAB (OUTPATIENT)
Dept: LAB | Facility: LAB | Age: 59
End: 2024-11-04
Payer: MEDICAID

## 2024-11-04 ENCOUNTER — APPOINTMENT (OUTPATIENT)
Dept: PRIMARY CARE | Facility: CLINIC | Age: 59
End: 2024-11-04
Payer: MEDICAID

## 2024-11-04 VITALS
SYSTOLIC BLOOD PRESSURE: 130 MMHG | DIASTOLIC BLOOD PRESSURE: 80 MMHG | OXYGEN SATURATION: 98 % | HEIGHT: 72 IN | RESPIRATION RATE: 18 BRPM | TEMPERATURE: 98.3 F | BODY MASS INDEX: 26.43 KG/M2 | HEART RATE: 73 BPM | WEIGHT: 195.13 LBS

## 2024-11-04 DIAGNOSIS — F41.9 ANXIETY: ICD-10-CM

## 2024-11-04 DIAGNOSIS — L65.9 HAIR LOSS: ICD-10-CM

## 2024-11-04 DIAGNOSIS — E55.9 VITAMIN D DEFICIENCY: ICD-10-CM

## 2024-11-04 DIAGNOSIS — E55.9 VITAMIN D DEFICIENCY: Primary | ICD-10-CM

## 2024-11-04 DIAGNOSIS — E53.8 VITAMIN B12 DEFICIENCY: ICD-10-CM

## 2024-11-04 DIAGNOSIS — K21.9 GASTROESOPHAGEAL REFLUX DISEASE WITHOUT ESOPHAGITIS: ICD-10-CM

## 2024-11-04 DIAGNOSIS — J01.10 ACUTE NON-RECURRENT FRONTAL SINUSITIS: ICD-10-CM

## 2024-11-04 LAB
25(OH)D3 SERPL-MCNC: 33 NG/ML (ref 30–100)
TSH SERPL-ACNC: 1.44 MIU/L (ref 0.44–3.98)
VIT B12 SERPL-MCNC: 539 PG/ML (ref 211–911)

## 2024-11-04 PROCEDURE — 3008F BODY MASS INDEX DOCD: CPT | Performed by: STUDENT IN AN ORGANIZED HEALTH CARE EDUCATION/TRAINING PROGRAM

## 2024-11-04 PROCEDURE — 82306 VITAMIN D 25 HYDROXY: CPT

## 2024-11-04 PROCEDURE — 3080F DIAST BP >= 90 MM HG: CPT | Performed by: STUDENT IN AN ORGANIZED HEALTH CARE EDUCATION/TRAINING PROGRAM

## 2024-11-04 PROCEDURE — 36415 COLL VENOUS BLD VENIPUNCTURE: CPT

## 2024-11-04 PROCEDURE — 3075F SYST BP GE 130 - 139MM HG: CPT | Performed by: STUDENT IN AN ORGANIZED HEALTH CARE EDUCATION/TRAINING PROGRAM

## 2024-11-04 PROCEDURE — 3079F DIAST BP 80-89 MM HG: CPT | Performed by: STUDENT IN AN ORGANIZED HEALTH CARE EDUCATION/TRAINING PROGRAM

## 2024-11-04 PROCEDURE — 82607 VITAMIN B-12: CPT

## 2024-11-04 PROCEDURE — 84443 ASSAY THYROID STIM HORMONE: CPT

## 2024-11-04 PROCEDURE — 3077F SYST BP >= 140 MM HG: CPT | Performed by: STUDENT IN AN ORGANIZED HEALTH CARE EDUCATION/TRAINING PROGRAM

## 2024-11-04 PROCEDURE — 1036F TOBACCO NON-USER: CPT | Performed by: STUDENT IN AN ORGANIZED HEALTH CARE EDUCATION/TRAINING PROGRAM

## 2024-11-04 PROCEDURE — 99214 OFFICE O/P EST MOD 30 MIN: CPT | Performed by: STUDENT IN AN ORGANIZED HEALTH CARE EDUCATION/TRAINING PROGRAM

## 2024-11-04 RX ORDER — ALPRAZOLAM 0.25 MG/1
0.25 TABLET ORAL 3 TIMES DAILY PRN
Qty: 30 TABLET | Refills: 2 | Status: SHIPPED | OUTPATIENT
Start: 2024-11-04 | End: 2025-07-02

## 2024-11-04 RX ORDER — OMEPRAZOLE 20 MG/1
20 CAPSULE, DELAYED RELEASE ORAL DAILY
Qty: 90 CAPSULE | Refills: 3 | Status: SHIPPED | OUTPATIENT
Start: 2024-11-04 | End: 2025-11-04

## 2024-11-04 RX ORDER — LORATADINE 10 MG/1
10 TABLET ORAL DAILY
Qty: 30 TABLET | Refills: 2 | Status: SHIPPED | OUTPATIENT
Start: 2024-11-04 | End: 2025-02-02

## 2024-11-04 ASSESSMENT — ENCOUNTER SYMPTOMS
DIZZINESS: 0
SHORTNESS OF BREATH: 0
LIGHT-HEADEDNESS: 0
VOMITING: 0
NAUSEA: 0
FEVER: 0

## 2024-11-05 NOTE — PROGRESS NOTES
Subjective   Robin Nguyễn is a 59 y.o. male who presents for Alopecia and Sinusitis (Pt here today to discuss hair loss and sinusitis).  Patient seen today for multiple concerns.  Patient over the past 3 days started with URI symptoms.  Previous history of sinusitis, requiring multiple antibiotics.  Patient states he has had associated nosebleeds with this, eye sensitivity, puffiness and slightly increased tearing.    Patient states that the symptoms are in the frontal sinuses, does have intermittent dizziness when getting up and out of bed with this as well.    Patient also seen today for hair loss.  Over the past 1 month he has had significantly more hair loss than previously has noticed thinning around the front of his scalp and down the midline where he salcido his hair.  Has noticed some increased hair when bathing.    Has had no other associated symptoms.    Sinusitis  Pertinent negatives include no shortness of breath.       Review of Systems   Constitutional:  Negative for fever.   Respiratory:  Negative for shortness of breath.    Cardiovascular:  Negative for chest pain.   Gastrointestinal:  Negative for nausea and vomiting.   Neurological:  Negative for dizziness and light-headedness.   All other systems reviewed and are negative.      Objective   Physical Exam  Vitals reviewed.   Constitutional:       General: He is not in acute distress.     Appearance: Normal appearance. He is not toxic-appearing.   HENT:      Head: Normocephalic and atraumatic.      Nose: Nose normal.   Eyes:      Extraocular Movements: Extraocular movements intact.   Cardiovascular:      Rate and Rhythm: Normal rate and regular rhythm.      Heart sounds: No murmur heard.     No friction rub. No gallop.   Pulmonary:      Effort: Pulmonary effort is normal. No respiratory distress.      Breath sounds: Normal breath sounds. No wheezing, rhonchi or rales.   Skin:     General: Skin is warm and dry.   Neurological:      General: No focal  deficit present.      Mental Status: He is alert.   Psychiatric:         Mood and Affect: Mood normal.         Behavior: Behavior normal.         Assessment/Plan   Problem List Items Addressed This Visit       Vitamin D deficiency - Primary    Relevant Orders    Vitamin D 25-Hydroxy,Total (for eval of Vitamin D levels)     Other Visit Diagnoses       Anxiety        Relevant Medications    ALPRAZolam (Xanax) 0.25 mg tablet    Vitamin B12 deficiency        Relevant Orders    Vitamin B12    Hair loss        Relevant Orders    TSH with reflex to Free T4 if abnormal (Completed)    Gastroesophageal reflux disease without esophagitis        Relevant Medications    omeprazole (PriLOSEC) 20 mg DR capsule    Other Relevant Orders    Referral to Gastroenterology    Acute non-recurrent frontal sinusitis        Relevant Medications    loratadine (Claritin) 10 mg tablet    sodium chloride (Ocean) 0.65 % nasal spray          Patient seen today for multiple concerns.    For patient's sinusitis discussed this likely being allergic, Rx Claritin, Ocean spray and saline for symptomatic improvement.  Follow-up in 1 week if symptoms persist or worsen.  Discussed not taking Flonase with nosebleeds.    For patient's hair loss we will check lab work as previously completed CBC CMP lipid panel we will add TSH vitamin D, vitamin B12 look for additional causes of of the hair loss.  Discussed it being typical male pattern hair loss.  Recommended minoxidil versus finasteride and patient deferred at this time.    Refilled patient's anxiety medication.    Follow-up as new concerns arise.

## 2024-11-08 DIAGNOSIS — L65.9 HAIR LOSS: Primary | ICD-10-CM

## 2024-11-11 ENCOUNTER — APPOINTMENT (OUTPATIENT)
Dept: PRIMARY CARE | Facility: CLINIC | Age: 59
End: 2024-11-11
Payer: MEDICAID

## 2024-11-11 ENCOUNTER — LAB (OUTPATIENT)
Dept: LAB | Facility: LAB | Age: 59
End: 2024-11-11
Payer: MEDICAID

## 2024-11-11 DIAGNOSIS — L65.9 HAIR LOSS: ICD-10-CM

## 2024-11-11 LAB
ERYTHROCYTE [DISTWIDTH] IN BLOOD BY AUTOMATED COUNT: 12 % (ref 11.5–14.5)
HCT VFR BLD AUTO: 44.5 % (ref 41–52)
HGB BLD-MCNC: 14.9 G/DL (ref 13.5–17.5)
MCH RBC QN AUTO: 31.1 PG (ref 26–34)
MCHC RBC AUTO-ENTMCNC: 33.5 G/DL (ref 32–36)
MCV RBC AUTO: 93 FL (ref 80–100)
NRBC BLD-RTO: 0 /100 WBCS (ref 0–0)
PLATELET # BLD AUTO: 211 X10*3/UL (ref 150–450)
RBC # BLD AUTO: 4.79 X10*6/UL (ref 4.5–5.9)
WBC # BLD AUTO: 4.7 X10*3/UL (ref 4.4–11.3)

## 2024-11-11 PROCEDURE — 36415 COLL VENOUS BLD VENIPUNCTURE: CPT

## 2024-11-11 PROCEDURE — 85027 COMPLETE CBC AUTOMATED: CPT

## 2024-11-22 ENCOUNTER — APPOINTMENT (OUTPATIENT)
Dept: PRIMARY CARE | Facility: CLINIC | Age: 59
End: 2024-11-22
Payer: MEDICAID

## 2024-11-22 VITALS
TEMPERATURE: 98.4 F | RESPIRATION RATE: 18 BRPM | SYSTOLIC BLOOD PRESSURE: 135 MMHG | BODY MASS INDEX: 26.45 KG/M2 | HEART RATE: 78 BPM | WEIGHT: 195 LBS | DIASTOLIC BLOOD PRESSURE: 88 MMHG | OXYGEN SATURATION: 96 %

## 2024-11-22 DIAGNOSIS — J01.10 ACUTE NON-RECURRENT FRONTAL SINUSITIS: Primary | ICD-10-CM

## 2024-11-22 PROCEDURE — 1036F TOBACCO NON-USER: CPT | Performed by: STUDENT IN AN ORGANIZED HEALTH CARE EDUCATION/TRAINING PROGRAM

## 2024-11-22 PROCEDURE — 99213 OFFICE O/P EST LOW 20 MIN: CPT | Performed by: STUDENT IN AN ORGANIZED HEALTH CARE EDUCATION/TRAINING PROGRAM

## 2024-11-22 PROCEDURE — 3079F DIAST BP 80-89 MM HG: CPT | Performed by: STUDENT IN AN ORGANIZED HEALTH CARE EDUCATION/TRAINING PROGRAM

## 2024-11-22 PROCEDURE — 3075F SYST BP GE 130 - 139MM HG: CPT | Performed by: STUDENT IN AN ORGANIZED HEALTH CARE EDUCATION/TRAINING PROGRAM

## 2024-11-22 RX ORDER — AMOXICILLIN AND CLAVULANATE POTASSIUM 875; 125 MG/1; MG/1
875 TABLET, FILM COATED ORAL 2 TIMES DAILY
Qty: 20 TABLET | Refills: 0 | Status: SHIPPED | OUTPATIENT
Start: 2024-11-22 | End: 2024-12-02

## 2024-11-22 ASSESSMENT — ENCOUNTER SYMPTOMS
SINUS PAIN: 1
SHORTNESS OF BREATH: 0
LIGHT-HEADEDNESS: 0
ABDOMINAL PAIN: 0
VOMITING: 0
DIZZINESS: 0
RHINORRHEA: 1
FEVER: 0
SORE THROAT: 0
NAUSEA: 0

## 2024-11-22 NOTE — PROGRESS NOTES
Subjective   Robin Nguyễn is a 59 y.o. male who presents for URI (Pt here today for URI. Pt went to urgent care over a week ago.).  Patient was once urgent care had negative COVID, flu testing.  Given Medrol Dosepak just recently started yesterday states some improvement with this.  But been persistent.    States that his downstairs neighbor smokes often.        URI   The current episode started in the past 7 days. The problem has been gradually worsening. There has been no fever. Associated symptoms include congestion, ear pain, rhinorrhea and sinus pain. Pertinent negatives include no abdominal pain, chest pain, nausea, sore throat or vomiting. He has tried increased fluids, decongestant, acetaminophen, antihistamine and NSAIDs for the symptoms. The treatment provided no relief.       Review of Systems   Constitutional:  Negative for fever.   HENT:  Positive for congestion, ear pain, rhinorrhea and sinus pain. Negative for sore throat.    Respiratory:  Negative for shortness of breath.    Cardiovascular:  Negative for chest pain.   Gastrointestinal:  Negative for abdominal pain, nausea and vomiting.   Neurological:  Negative for dizziness and light-headedness.   All other systems reviewed and are negative.      Objective   Physical Exam  Vitals reviewed.   Constitutional:       General: He is not in acute distress.     Appearance: Normal appearance. He is not toxic-appearing.   HENT:      Head: Normocephalic and atraumatic.      Nose: Congestion present.      Mouth/Throat:      Mouth: Mucous membranes are moist.      Pharynx: Posterior oropharyngeal erythema present.   Eyes:      Extraocular Movements: Extraocular movements intact.   Cardiovascular:      Rate and Rhythm: Normal rate and regular rhythm.      Heart sounds: No murmur heard.     No friction rub. No gallop.   Pulmonary:      Effort: Pulmonary effort is normal. No respiratory distress.      Breath sounds: Normal breath sounds. No wheezing, rhonchi or  rales.   Skin:     General: Skin is warm and dry.   Neurological:      General: No focal deficit present.      Mental Status: He is alert.   Psychiatric:         Mood and Affect: Mood normal.         Behavior: Behavior normal.         Assessment/Plan   Problem List Items Addressed This Visit    None  Visit Diagnoses       Acute non-recurrent frontal sinusitis    -  Primary    Relevant Medications    amoxicillin-pot clavulanate (Augmentin) 875-125 mg tablet        Patient seen today for recurrent sinusitis.  Continue Medrol Dosepak from urgent care.  Will prescribe Augmentin as he states this has helped in the past  Continue with saline flushes as well as Claritin.    Follow-up in 10 days or sooner if symptoms persist or worsen.

## 2024-11-25 ENCOUNTER — HOSPITAL ENCOUNTER (EMERGENCY)
Facility: HOSPITAL | Age: 59
Discharge: HOME | End: 2024-11-25
Attending: STUDENT IN AN ORGANIZED HEALTH CARE EDUCATION/TRAINING PROGRAM
Payer: MEDICAID

## 2024-11-25 VITALS
DIASTOLIC BLOOD PRESSURE: 106 MMHG | SYSTOLIC BLOOD PRESSURE: 153 MMHG | RESPIRATION RATE: 20 BRPM | WEIGHT: 191 LBS | TEMPERATURE: 98.2 F | BODY MASS INDEX: 25.87 KG/M2 | HEIGHT: 72 IN | OXYGEN SATURATION: 99 % | HEART RATE: 88 BPM

## 2024-11-25 DIAGNOSIS — J01.10 ACUTE FRONTAL SINUSITIS, RECURRENCE NOT SPECIFIED: Primary | ICD-10-CM

## 2024-11-25 PROCEDURE — 99283 EMERGENCY DEPT VISIT LOW MDM: CPT | Performed by: STUDENT IN AN ORGANIZED HEALTH CARE EDUCATION/TRAINING PROGRAM

## 2024-11-25 PROCEDURE — 99281 EMR DPT VST MAYX REQ PHY/QHP: CPT

## 2024-11-25 RX ORDER — ACETAMINOPHEN 325 MG/1
975 TABLET ORAL ONCE
Status: DISCONTINUED | OUTPATIENT
Start: 2024-11-25 | End: 2024-11-25 | Stop reason: HOSPADM

## 2024-11-25 ASSESSMENT — COLUMBIA-SUICIDE SEVERITY RATING SCALE - C-SSRS
2. HAVE YOU ACTUALLY HAD ANY THOUGHTS OF KILLING YOURSELF?: NO
1. IN THE PAST MONTH, HAVE YOU WISHED YOU WERE DEAD OR WISHED YOU COULD GO TO SLEEP AND NOT WAKE UP?: NO
6. HAVE YOU EVER DONE ANYTHING, STARTED TO DO ANYTHING, OR PREPARED TO DO ANYTHING TO END YOUR LIFE?: NO

## 2024-11-25 ASSESSMENT — PAIN - FUNCTIONAL ASSESSMENT: PAIN_FUNCTIONAL_ASSESSMENT: 0-10

## 2024-11-25 ASSESSMENT — LIFESTYLE VARIABLES
EVER FELT BAD OR GUILTY ABOUT YOUR DRINKING: NO
EVER HAD A DRINK FIRST THING IN THE MORNING TO STEADY YOUR NERVES TO GET RID OF A HANGOVER: NO
TOTAL SCORE: 0
HAVE YOU EVER FELT YOU SHOULD CUT DOWN ON YOUR DRINKING: NO
HAVE PEOPLE ANNOYED YOU BY CRITICIZING YOUR DRINKING: NO

## 2024-11-25 ASSESSMENT — PAIN DESCRIPTION - DESCRIPTORS: DESCRIPTORS: PRESSURE

## 2024-11-25 ASSESSMENT — PAIN SCALES - GENERAL
PAINLEVEL_OUTOF10: 8
PAINLEVEL_OUTOF10: 8

## 2024-11-25 ASSESSMENT — PAIN DESCRIPTION - LOCATION: LOCATION: HEAD

## 2024-11-25 ASSESSMENT — PAIN DESCRIPTION - FREQUENCY: FREQUENCY: CONSTANT/CONTINUOUS

## 2024-11-25 NOTE — DISCHARGE INSTRUCTIONS
Please follow up with your primary care provider within 7 days for hospital follow up. Please call to make this appointment.   Please start taking the Augmentin which was prescribed by your PCP and please take it as prescribed.  Please return to the ED if you experience worsening of headache, nasal discharge, fever or any other concerning symptoms.    Please take your medications as prescribed.     If you have any new or worsening symptoms seek medical attention.    Thank you for allowing us to participate in your care!    -[facility].

## 2024-11-25 NOTE — ED PROVIDER NOTES
History of Present Illness     History provided by: Patient  Limitations to History: None  External Records Reviewed with Brief Summary:  Progress note    HPI:  Robin Nguyễn is a 59 y.o. male patient with previous medical history of hypertension and diverticulitis presents to the [facility] ED with a chief complaint of mild supraorbital pain and headache of 11 days duration.  Patient endorses yellowish nasal discharge.  Patient also endorses ear pressure and decreased hearing bilaterally which has been going on for the past 2 weeks.  On 11/23/2022 patient was seen at his PCP office and was suspected to have acute nonrecurrent frontal sinusitis and Augmentin was prescribed, patient has not yet started taking it saying he wants to confirm he has sinusitis before starting taking the Augmentin.  Otherwise patient denies blurring of vision, chest pain, cough, shortness of breath, abdominal pain, nausea, vomiting, diarrhea, dysuria or leg pain.  Physical Exam   Triage vitals:  T 36.8 °C (98.2 °F)  HR 90  /90  RR 20  O2 100 % None (Room air)    Physical Exam  Vitals and nursing note reviewed.   Constitutional:       General: He is not in acute distress.     Appearance: He is well-developed.   HENT:      Head: Normocephalic and atraumatic.   Eyes:      Conjunctiva/sclera: Conjunctivae normal.   Cardiovascular:      Rate and Rhythm: Normal rate and regular rhythm.      Heart sounds: No murmur heard.  Pulmonary:      Effort: Pulmonary effort is normal. No respiratory distress.      Breath sounds: Normal breath sounds.   Abdominal:      Palpations: Abdomen is soft.      Tenderness: There is no abdominal tenderness.   Musculoskeletal:         General: No swelling.      Cervical back: Neck supple.   Skin:     General: Skin is warm and dry.      Capillary Refill: Capillary refill takes less than 2 seconds.   Neurological:      Mental Status: He is alert.   Psychiatric:         Mood and Affect: Mood normal.          Medical  Decision Making & ED Course   Medical Decision Makin y.o. male patient with previous medical history of hypertension and diverticulitis presents to the [facility] ED with a chief complaint of mild supraorbital pain and headache of 11 days duration.  Patient endorses yellowish nasal discharge.  Patient also endorses ear pressure and decreased hearing bilaterally which has been going on for the past 2 weeks.  On 2022 patient was seen at his PCP office and was suspected to have acute nonrecurrent frontal sinusitis and Augmentin was prescribed, patient has not yet started taking it.  Patient has benign physical examination findings.  Patient advised to start taking the Augmentin which was prescribed by his PCP on 2020.  Patient states that he has the Augmentin at home.  Advil sent to his pharmacy.    I discussed the differential, results and discharge plan with the patient. I emphasized the importance of follow-up with his PCP. I explained reasons for the patient to return to the emergency department. Questions were addressed. They understand return precautions and discharge instructions. The patient  expressed understanding.    ----      Differential diagnoses considered include but are not limited to: Sinusitis     Social Determinants of Health which Significantly Impact Care: None identified     EKG Independent Interpretation: EKG not obtained    Independent Result Review and Interpretation: None obtained    Chronic conditions affecting the patient's care: As documented above in [facility]    The patient was discussed with the following consultants/services: None    Care Considerations: As documented above in [facility]    ED Course:     Disposition   Patient is suspected to have frontal sinusitis and advised to start taking his Augmentin which was prescribed by his PCP.  Patient is discharged in stable condition.    Procedures   Procedures    Patient seen and discussed with ED attending physician.    Hakan Vivas,  MD  PGY1 IM resident  Emergency Medicine      Hakan Vivas MD  Resident  11/25/24 [id number]

## 2024-12-09 DIAGNOSIS — S09.90XA INJURY OF HEAD, INITIAL ENCOUNTER: ICD-10-CM

## 2024-12-09 DIAGNOSIS — G44.229 CHRONIC TENSION-TYPE HEADACHE, NOT INTRACTABLE: Primary | ICD-10-CM

## 2024-12-09 DIAGNOSIS — F07.81 POST CONCUSSION SYNDROME: Primary | ICD-10-CM

## 2024-12-09 RX ORDER — AMITRIPTYLINE HYDROCHLORIDE 10 MG/1
TABLET, FILM COATED ORAL
Qty: 90 TABLET | Refills: 5 | Status: SHIPPED | OUTPATIENT
Start: 2024-12-09

## 2024-12-10 ENCOUNTER — APPOINTMENT (OUTPATIENT)
Dept: RADIOLOGY | Facility: HOSPITAL | Age: 59
End: 2024-12-10
Payer: MEDICAID

## 2024-12-10 ENCOUNTER — HOSPITAL ENCOUNTER (EMERGENCY)
Facility: HOSPITAL | Age: 59
Discharge: HOME | End: 2024-12-10
Attending: EMERGENCY MEDICINE
Payer: MEDICAID

## 2024-12-10 VITALS
RESPIRATION RATE: 18 BRPM | WEIGHT: 191 LBS | HEIGHT: 72 IN | SYSTOLIC BLOOD PRESSURE: 163 MMHG | OXYGEN SATURATION: 98 % | BODY MASS INDEX: 25.87 KG/M2 | TEMPERATURE: 98.6 F | HEART RATE: 70 BPM | DIASTOLIC BLOOD PRESSURE: 83 MMHG

## 2024-12-10 DIAGNOSIS — R42 DIZZINESS: ICD-10-CM

## 2024-12-10 DIAGNOSIS — H93.13 TINNITUS OF BOTH EARS: ICD-10-CM

## 2024-12-10 DIAGNOSIS — R51.9 NONINTRACTABLE HEADACHE, UNSPECIFIED CHRONICITY PATTERN, UNSPECIFIED HEADACHE TYPE: Primary | ICD-10-CM

## 2024-12-10 PROCEDURE — 70450 CT HEAD/BRAIN W/O DYE: CPT | Performed by: STUDENT IN AN ORGANIZED HEALTH CARE EDUCATION/TRAINING PROGRAM

## 2024-12-10 PROCEDURE — 70450 CT HEAD/BRAIN W/O DYE: CPT

## 2024-12-10 PROCEDURE — 99284 EMERGENCY DEPT VISIT MOD MDM: CPT | Mod: 25 | Performed by: EMERGENCY MEDICINE

## 2024-12-10 RX ORDER — METOPROLOL SUCCINATE 25 MG/1
25 TABLET, EXTENDED RELEASE ORAL DAILY
Qty: 20 TABLET | Refills: 0 | Status: SHIPPED | OUTPATIENT
Start: 2024-12-10 | End: 2024-12-30

## 2024-12-10 ASSESSMENT — LIFESTYLE VARIABLES
HAVE YOU EVER FELT YOU SHOULD CUT DOWN ON YOUR DRINKING: NO
HAVE PEOPLE ANNOYED YOU BY CRITICIZING YOUR DRINKING: NO
TOTAL SCORE: 0
EVER HAD A DRINK FIRST THING IN THE MORNING TO STEADY YOUR NERVES TO GET RID OF A HANGOVER: NO
EVER FELT BAD OR GUILTY ABOUT YOUR DRINKING: NO

## 2024-12-10 ASSESSMENT — PAIN - FUNCTIONAL ASSESSMENT: PAIN_FUNCTIONAL_ASSESSMENT: 0-10

## 2024-12-10 ASSESSMENT — PAIN DESCRIPTION - PAIN TYPE: TYPE: CHRONIC PAIN

## 2024-12-10 ASSESSMENT — PAIN SCALES - GENERAL
PAINLEVEL_OUTOF10: 3

## 2024-12-10 ASSESSMENT — PAIN DESCRIPTION - PROGRESSION: CLINICAL_PROGRESSION: NOT CHANGED

## 2024-12-10 ASSESSMENT — PAIN DESCRIPTION - LOCATION: LOCATION: HEAD

## 2024-12-10 NOTE — ED PROVIDER NOTES
HPI   Chief Complaint   Patient presents with    Headache    Dizziness       Patient is a 59-year-old male presenting to the ED with a headache and dizziness.  Patient states he was here about 2 weeks ago with a headache and facial pain and was diagnosed with a sinus infection.  He states he followed up with Dr. Alston who is an ENT provider.  A scope was obtained which was normal.  Patient then states his neurologist reached out to him via [employer] and stated the patient likely has a concussion.  She had scheduled him a CT scan.  When inquiring about this, patient states this is his fourth concussion.  He endorses hitting his head on the car door about 3 weeks ago.  He endorses nonstop headache, dizziness, and ear ringing for the past 3 weeks.  He states he has not gotten relief with any over-the-counter medications.  His neurologist prescribed him amitriptyline, but he has not yet picked it up from the pharmacy.              Patient History   Past Medical History:   Diagnosis Date    Abdominal distension (gaseous) 07/13/2018    Abdominal bloating    Abdominal distension (gaseous) 07/13/2018    Abdominal bloating    Abdominal distension (gaseous) 07/18/2018    Abdominal bloating    Abdominal distension (gaseous) 07/18/2018    Abdominal bloating    Abnormal levels of other serum enzymes     Elevated liver enzymes    Allergic rhinitis, unspecified 05/29/2020    Allergic rhinitis with postnasal drip    COVID-19 07/20/2022    COVID-19    Decreased white blood cell count, unspecified 05/29/2020    Leukopenia    Demyelinating disease of central nervous system, unspecified 10/14/2019    Demyelinating disorder    Diarrhea, unspecified 07/13/2018    Acute diarrhea    Diarrhea, unspecified 07/13/2018    Acute diarrhea    Diarrhea, unspecified 07/18/2018    Acute diarrhea    Diarrhea, unspecified 07/18/2018    Acute diarrhea    Disease of stomach and duodenum, unspecified 07/13/2018    Subepithelial gastric lesion    Disease  of stomach and duodenum, unspecified 07/13/2018    Subepithelial gastric lesion    Disease of stomach and duodenum, unspecified 07/18/2018    Subepithelial gastric lesion    Disease of stomach and duodenum, unspecified 07/18/2018    Subepithelial gastric lesion    Disorder of the skin and subcutaneous tissue, unspecified 09/05/2019    Skin abnormality    Essential (primary) hypertension 07/20/2022    HTN (hypertension), benign    Helicobacter pylori (H. pylori) as the cause of diseases classified elsewhere 10/11/2019    Helicobacter pylori [H. pylori] as the cause of diseases classified elsewhere    Impacted cerumen, left ear 12/19/2019    Impacted cerumen of left ear    Incomplete defecation 09/21/2020    Incomplete defecation    Iron deficiency     Low iron    Left upper quadrant pain 09/21/2020    Left upper quadrant abdominal pain    Long term (current) use of non-steroidal anti-inflammatories (nsaid) 06/29/2021    NSAID long-term use    Malabsorption due to intolerance, not elsewhere classified 07/13/2018    PLE (protein-losing enteropathy)    Malabsorption due to intolerance, not elsewhere classified 07/13/2018    PLE (protein-losing enteropathy)    Malabsorption due to intolerance, not elsewhere classified 07/18/2018    PLE (protein-losing enteropathy)    Malabsorption due to intolerance, not elsewhere classified 07/18/2018    PLE (protein-losing enteropathy)    Myopia, bilateral 05/24/2021    Bilateral myopia    Nontoxic multinodular goiter 03/26/2021    Multinodular goiter    Other chest pain 10/02/2020    Chest pain, non-cardiac    Other constipation 07/13/2018    Chronic constipation    Other constipation 07/13/2018    Chronic constipation    Other constipation 07/18/2018    Chronic constipation    Other constipation 07/18/2018    Chronic constipation    Other diseases of stomach and duodenum 07/13/2018    Gastric nodule    Other diseases of stomach and duodenum 07/13/2018    Gastric nodule    Other  diseases of stomach and duodenum 07/18/2018    Gastric nodule    Other diseases of stomach and duodenum 07/18/2018    Gastric nodule    Other forms of dyspnea 01/10/2021    Exertional dyspnea    Other microscopic hematuria 07/13/2018    Microscopic hematuria    Other microscopic hematuria 07/13/2018    Microscopic hematuria    Other microscopic hematuria 07/18/2018    Microscopic hematuria    Other microscopic hematuria 07/18/2018    Microscopic hematuria    Otitis media, unspecified, right ear 09/12/2019    Right otitis media    Paresthesia of skin     Prickling sensation    Personal history of other diseases of the circulatory system 05/17/2018    History of paroxysmal supraventricular tachycardia    Personal history of other diseases of the nervous system and sense organs     History of paralysis    Personal history of other diseases of the respiratory system     History of sinusitis    Personal history of other diseases of the respiratory system 10/22/2019    History of nasal obstruction    Personal history of other diseases of the respiratory system 04/08/2020    History of acute sinusitis    Personal history of other specified conditions     H/O shortness of breath    Personal history of other specified conditions 09/20/2019    History of dyspnea    Personal history of other specified conditions 05/29/2020    History of palpitations    Personal history of other specified conditions 10/02/2020    History of dizziness    Personal history of other specified conditions 10/02/2020    History of palpitations    Personal history of other venous thrombosis and embolism     H/O blood clots    Post-traumatic headache, unspecified, not intractable 09/09/2020    Post-concussion headache    Unspecified asthma, uncomplicated ([facility]) 09/21/2021    Asthma    Unspecified atherosclerosis of native arteries of extremities, other extremity (CMS-HCC) 01/31/2020    Radial artery occlusion, right    Unspecified blepharitis right  upper eyelid 05/24/2021    Blepharitis of right upper eyelid    Upper abdominal pain, unspecified 10/10/2018    Upper abdominal pain of unknown etiology    Upper abdominal pain, unspecified 07/13/2018    Upper abdominal pain of unknown etiology    Upper abdominal pain, unspecified 07/13/2018    Upper abdominal pain of unknown etiology    Upper abdominal pain, unspecified 07/18/2018    Upper abdominal pain of unknown etiology    Upper abdominal pain, unspecified 07/18/2018    Upper abdominal pain of unknown etiology     Past Surgical History:   Procedure Laterality Date    ADENOIDECTOMY  05/17/2018    Adenoidectomy    COLONOSCOPY  05/17/2018    Colonoscopy    CT ANGIO NECK  4/27/2021    CT NECK ANGIO W AND WO IV CONTRAST 4/27/2021 [facility]    CT HEAD ANGIO W AND WO IV CONTRAST  4/27/2021    CT HEAD ANGIO W AND WO IV CONTRAST 4/27/2021 [facility]    ESOPHAGOGASTRODUODENOSCOPY  05/17/2018    Diagnostic Esophagogastroduodenoscopy    OTHER SURGICAL HISTORY  05/17/2018    Endoscopic Ultrasound Pancreatic    OTHER SURGICAL HISTORY  05/17/2018    Append Laparosc Addl ___ Mm Port Placed In R Lower Abdomen    OTHER SURGICAL HISTORY  07/20/2022    Ganglion cyst excision    OTHER SURGICAL HISTORY  09/05/2019    Appendectomy    OTHER SURGICAL HISTORY  10/14/2019    Liver biopsy     No family history on file.  Social History     Tobacco Use    Smoking status: Never    Smokeless tobacco: Never   Substance Use Topics    Alcohol use: Not Currently    Drug use: Never       Physical Exam   ED Triage Vitals [12/10/24 1037]   Temperature Heart Rate Respirations BP   37 °C (98.6 °F) 74 20 152/76      Pulse Ox Temp Source Heart Rate Source Patient Position   100 % Temporal Monitor Sitting      BP Location FiO2 (%)     Right arm --       Physical Exam  Vitals reviewed.   Constitutional:       General: He is not in acute distress.     Appearance: He is not ill-appearing.   HENT:      Head: Normocephalic and atraumatic.    Eyes:      Extraocular Movements: Extraocular movements intact.      Conjunctiva/sclera: Conjunctivae normal.      Pupils: Pupils are equal, round, and reactive to light.   Cardiovascular:      Rate and Rhythm: Normal rate and regular rhythm.   Pulmonary:      Effort: Pulmonary effort is normal. No respiratory distress.      Breath sounds: Normal breath sounds.   Musculoskeletal:      Cervical back: Normal range of motion and neck supple. No rigidity or tenderness.   Neurological:      General: No focal deficit present.      Mental Status: He is alert and oriented to person, place, and time.      Cranial Nerves: Cranial nerves 2-12 are intact.      Sensory: Sensation is intact.      Motor: Motor function is intact. No weakness or abnormal muscle tone.      Coordination: Coordination is intact. Finger-Nose-Finger Test normal.      Gait: Gait is intact. Gait normal.           ED Course & MDM   Diagnoses as of 12/10/24 1428   Nonintractable headache, unspecified chronicity pattern, unspecified headache type   Dizziness   Tinnitus of both ears     CT head wo IV contrast   Final Result   No CT evidence of acute intracranial abnormality.        MACRO   None        Signed by: Erin Winn 12/10/2024 1:56 PM   Dictation workstation:   [id number]                  No data recorded     [place] Coma Scale Score: 15 (12/10/24 1038 : May Steven RN)                           Medical Decision Making  Patient is a 59-year-old male presenting to the ED with a headache and dizziness.  History was obtained from the patient.  Was here few weeks ago and diagnosed with a sinus infection.  Followed up with ENT and had a normal scope.  Neurologist reached out to him via MyChart and stated he likely has a concussion.  She would like him to have a CT scan.  Patient then admitted to head trauma via his car door frame about 3 weeks ago.  Endorses persistent headache, dizziness, and tinnitus since that time.  On physical exam, patient  is sitting comfortably and in no apparent distress.  PERRLA.  Bilateral EOMs intact without nystagmus.  Neurologically intact in terms of sensation, motor function, coordination, and gait without obvious deficit.  Remainder of exam as noted above.  Vital signs are stable.    Given patient's persistent neurologic symptoms for the past 3 weeks, will obtain CT scan to evaluate for concussion versus other acute intracranial abnormality such as lesion or hemorrhage.  CT scan revealed no acute abnormalities.  These results were discussed with the patient.  He is to continue OTC medications at home as he has been.  Otherwise, emphasized the importance he  his prescription for amitriptyline and begin taking it ASAP.  He is to follow-up at his scheduled appointment with his neurologist next week for further evaluation/management of his concerns/symptoms.  He did request a refill on his metoprolol which was sent to his pharmacy.  Return precautions otherwise discussed.  Patient is agreeable to this plan and all of his questions were answered to satisfaction.  He was discharged from the ED in stable condition.        Procedure  Procedures     Kenzie Mullen PA-C  12/10/24 5625      This patient was seen by the advanced practice provider.  I have personally performed a substantive portion of the encounter.  I have seen and examined the patient; agree with the workup, evaluation, MDM, management and diagnosis.  The care plan has been discussed.      I personally saw the patient and made/approved the management plan and take responsibility for the patient management.     Juan Carlos Mariee,   12/17/24 0752

## 2024-12-10 NOTE — DISCHARGE INSTRUCTIONS
The CT scan we obtained of your head was normal.  It is likely your symptoms are all due to a mild concussion.  Please continue resting and taking it easy.  Limit any kind of strenuous activity or heavy exercise within the coming weeks.  Continue taking Tylenol/ibuprofen as needed.  I would also recommend picking up the prescription for amitriptyline and begin taking it tomorrow.  I did refill your metoprolol and that was sent to [facility] pharmacy.  Please follow-up at your scheduled appointment with your neurologist next week for further evaluation/management of your symptoms.

## 2024-12-10 NOTE — ED TRIAGE NOTES
"Patient has been having these symptoms for 3.5 weeks (headache/dizziness/\"balance issues\").  Patient states he was misdiagnosed with a sinus infection and he was told by his neurologist (Dr. Acevedo) that he had a concussion.  He had a \"scope\" with his ENT and was told he did not have a sinus infection.  "

## 2024-12-12 ENCOUNTER — APPOINTMENT (OUTPATIENT)
Dept: PRIMARY CARE | Facility: CLINIC | Age: 59
End: 2024-12-12
Payer: MEDICAID

## 2024-12-20 ENCOUNTER — APPOINTMENT (OUTPATIENT)
Dept: NEUROLOGY | Facility: CLINIC | Age: 59
End: 2024-12-20
Payer: MEDICAID

## 2024-12-20 VITALS
BODY MASS INDEX: 26.09 KG/M2 | HEART RATE: 94 BPM | WEIGHT: 196.9 LBS | SYSTOLIC BLOOD PRESSURE: 121 MMHG | DIASTOLIC BLOOD PRESSURE: 79 MMHG | TEMPERATURE: 96.9 F | HEIGHT: 73 IN

## 2024-12-20 DIAGNOSIS — G43.109 MIGRAINE WITH AURA AND WITHOUT STATUS MIGRAINOSUS, NOT INTRACTABLE: ICD-10-CM

## 2024-12-20 DIAGNOSIS — H93.13 TINNITUS OF BOTH EARS: ICD-10-CM

## 2024-12-20 DIAGNOSIS — I47.19 ATRIAL TACHYCARDIA (CMS-HCC): ICD-10-CM

## 2024-12-20 DIAGNOSIS — H91.93 BILATERAL HEARING LOSS, UNSPECIFIED HEARING LOSS TYPE: ICD-10-CM

## 2024-12-20 DIAGNOSIS — F07.81 POST CONCUSSION SYNDROME: Primary | ICD-10-CM

## 2024-12-20 DIAGNOSIS — G44.229 CHRONIC TENSION-TYPE HEADACHE, NOT INTRACTABLE: ICD-10-CM

## 2024-12-20 PROCEDURE — 3078F DIAST BP <80 MM HG: CPT | Performed by: STUDENT IN AN ORGANIZED HEALTH CARE EDUCATION/TRAINING PROGRAM

## 2024-12-20 PROCEDURE — 99214 OFFICE O/P EST MOD 30 MIN: CPT | Performed by: STUDENT IN AN ORGANIZED HEALTH CARE EDUCATION/TRAINING PROGRAM

## 2024-12-20 PROCEDURE — 3008F BODY MASS INDEX DOCD: CPT | Performed by: STUDENT IN AN ORGANIZED HEALTH CARE EDUCATION/TRAINING PROGRAM

## 2024-12-20 PROCEDURE — 1036F TOBACCO NON-USER: CPT | Performed by: STUDENT IN AN ORGANIZED HEALTH CARE EDUCATION/TRAINING PROGRAM

## 2024-12-20 PROCEDURE — 3074F SYST BP LT 130 MM HG: CPT | Performed by: STUDENT IN AN ORGANIZED HEALTH CARE EDUCATION/TRAINING PROGRAM

## 2024-12-20 ASSESSMENT — LIFESTYLE VARIABLES
AUDIT-C TOTAL SCORE: 0
SKIP TO QUESTIONS 9-10: 1
HOW OFTEN DO YOU HAVE SIX OR MORE DRINKS ON ONE OCCASION: NEVER
HOW MANY STANDARD DRINKS CONTAINING ALCOHOL DO YOU HAVE ON A TYPICAL DAY: PATIENT DOES NOT DRINK
HOW OFTEN DO YOU HAVE A DRINK CONTAINING ALCOHOL: NEVER

## 2024-12-20 ASSESSMENT — VISUAL ACUITY: VA_NORMAL: 1

## 2024-12-20 ASSESSMENT — PATIENT HEALTH QUESTIONNAIRE - PHQ9
SUM OF ALL RESPONSES TO PHQ9 QUESTIONS 1 & 2: 0
2. FEELING DOWN, DEPRESSED OR HOPELESS: NOT AT ALL
1. LITTLE INTEREST OR PLEASURE IN DOING THINGS: NOT AT ALL

## 2024-12-20 NOTE — PROGRESS NOTES
[facility]   Referring: No ref. provider found  PCP: No primary care provider on file.  Chief Complaint   Patient presents with    Headache     Subjective   Robin Nguyễn is a 59 y.o. year old male presenting for a follow-up . He has a history of migraines as well as 3 concussions. He bumped his head slightly on his car doorframe about 6 weeks ago and ever since then he has had daily headaches, dizziness, and tinnitus. He also describes an increased pressure in both of his ears as well as occasional popping. He says that he did not experience the symptoms until 2-3 days following the incident. That was different than his previous concussions when he had blurry vision immediately following each incident. He denies any vision changes, weakness, or tingling. He says his dizziness is much worse when changing positions from sitting to standing. He was prescribed amitriptyline for these symptoms but has only taken 10mg daily and states he does not think it is working. He also occasionally takes Tylenol for the migraines which works for about 2 hours and then the migraine returns. He also notes that he stopped taking his metoprolol daily due to a low heart rate and only taking it occasionally.     He was last seen 5/21/2024.     Patient Active Problem List   Diagnosis    Vitamin D deficiency    Shortness of breath    Acute diverticulitis    Microscopic hematuria    Hyperlipidemia    Essential hypertension    Chest pain    Atrial tachycardia (CMS-HCC)    Mild persistent asthma, unspecified whether complicated (HHS-HCC)    Premature atrial contraction    Neck pain    Bilateral hearing loss    Tinnitus of both ears     Objective   Neurological Exam  Mental Status  Awake, alert and oriented to person, place and time. Speech is normal. Language is fluent with no aphasia. Attention and concentration are normal.    Cranial Nerves  CN II: Visual acuity is normal. Visual fields full to confrontation.  CN  III, IV, VI: Extraocular movements intact bilaterally. Normal lids and orbits bilaterally. Pupils equal round and reactive to light bilaterally.  CN V: Facial sensation is normal.  CN VII: Full and symmetric facial movement.  CN VIII: Hearing is normal.  CN IX, X: Palate elevates symmetrically  CN XI: Shoulder shrug strength is normal.  CN XII: Tongue midline without atrophy or fasciculations.    Motor   Strength is 5/5 throughout all four extremities.    Coordination  Right: Finger-to-nose normal. Rapid alternating movement normal. Heel-to-shin normal.    Gait  Casual gait is normal including stance, stride, and arm swing. Tends to over cross feet when walking .Normal toe walking. Normal heel walking. Normal tandem gait. Romberg is absent.    Physical Exam  Eyes:      General: Lids are normal.      Extraocular Movements: Extraocular movements intact.      Pupils: Pupils are equal, round, and reactive to light.   Neurological:      Motor: Motor strength is normal.     Coordination: Romberg sign negative.   Psychiatric:         Speech: Speech normal.     Assessment/Plan   Robin is a 59 year-old male with hx of migraines and 3 previous concussions. He has been experiencing daily headaches, dizziness, and tinnitus for 6 weeks after bumping his head on the car doorframe most likely post concussion syndrome.  Diagnoses and all orders for this visit:  Post concussion syndrome  Atrial tachycardia (CMS-HCC)  Bilateral hearing loss, unspecified hearing loss type  -     Referral to Audiology; Future  Tinnitus of both ears  -     Referral to Audiology; Future  Chronic tension-type headache, not intractable  Migraine with aura and without status migrainosus, not intractable  Increase Amitriptyline to 10mg BID for one week then increase to 10mg TID  Audiology referral for tinnitus and hearing loss  Follow-up in 6 months  There are no Patient Instructions on file for this visit.

## 2024-12-23 ENCOUNTER — APPOINTMENT (OUTPATIENT)
Dept: RADIOLOGY | Facility: CLINIC | Age: 59
End: 2024-12-23
Payer: MEDICAID

## 2025-01-02 ENCOUNTER — APPOINTMENT (OUTPATIENT)
Dept: PRIMARY CARE | Facility: CLINIC | Age: 60
End: 2025-01-02
Payer: MEDICAID

## 2025-01-20 ENCOUNTER — APPOINTMENT (OUTPATIENT)
Dept: GASTROENTEROLOGY | Facility: CLINIC | Age: 60
End: 2025-01-20
Payer: MEDICAID

## 2025-01-28 ENCOUNTER — APPOINTMENT (OUTPATIENT)
Dept: CARDIOLOGY | Facility: CLINIC | Age: 60
End: 2025-01-28
Payer: MEDICAID

## 2025-05-20 ENCOUNTER — APPOINTMENT (OUTPATIENT)
Dept: NEUROLOGY | Facility: CLINIC | Age: 60
End: 2025-05-20
Payer: MEDICAID